# Patient Record
Sex: FEMALE | Employment: FULL TIME | URBAN - METROPOLITAN AREA
[De-identification: names, ages, dates, MRNs, and addresses within clinical notes are randomized per-mention and may not be internally consistent; named-entity substitution may affect disease eponyms.]

---

## 2023-09-06 LAB
C. TRACHOMATIS, EXTERNAL RESULT: NEGATIVE
N. GONORRHOEAE, EXTERNAL RESULT: NEGATIVE

## 2023-10-04 LAB
ABO, EXTERNAL RESULT: NORMAL
HEP B, EXTERNAL RESULT: NEGATIVE
HEPATITIS C ANTIBODY, EXTERNAL RESULT: NEGATIVE
HIV, EXTERNAL RESULT: NEGATIVE
RH FACTOR, EXTERNAL RESULT: POSITIVE
RPR, EXTERNAL RESULT: NEGATIVE
RUBELLA TITER, EXTERNAL RESULT: NORMAL

## 2023-12-29 ENCOUNTER — ROUTINE PRENATAL (OUTPATIENT)
Dept: OBGYN CLINIC | Age: 28
End: 2023-12-29
Payer: MEDICAID

## 2023-12-29 VITALS
RESPIRATION RATE: 18 BRPM | HEIGHT: 67 IN | WEIGHT: 238 LBS | SYSTOLIC BLOOD PRESSURE: 124 MMHG | HEART RATE: 88 BPM | OXYGEN SATURATION: 99 % | BODY MASS INDEX: 37.35 KG/M2 | DIASTOLIC BLOOD PRESSURE: 70 MMHG | TEMPERATURE: 97 F

## 2023-12-29 DIAGNOSIS — O40.9XX0 POLYHYDRAMNIOS AFFECTING PREGNANCY: ICD-10-CM

## 2023-12-29 DIAGNOSIS — Z34.82 PRENATAL CARE, SUBSEQUENT PREGNANCY IN SECOND TRIMESTER: Primary | ICD-10-CM

## 2023-12-29 DIAGNOSIS — A60.00 HERPES SIMPLEX INFECTION OF GENITOURINARY SYSTEM: ICD-10-CM

## 2023-12-29 PROCEDURE — 99213 OFFICE O/P EST LOW 20 MIN: CPT | Performed by: OBSTETRICS & GYNECOLOGY

## 2023-12-30 NOTE — ASSESSMENT & PLAN NOTE
New diagnosis today, MVP 8.0cm however YVROSE not calculated  - will reassess in 2 weeks, plan for GTT at that time as well given macrosomia as well

## 2023-12-30 NOTE — ASSESSMENT & PLAN NOTE
- FWB: reassuring by US today  - Genetic screening: [id number] neg, it's a boy  - Anatomy scan: 12/1/23 -- 534g (93%ile), nl anatomy (limited profile, orbits, AA, DA), CL 5.40cm, nl fluid, vertex    - 12/28/23 -- 1011g (91%ile), nl completion anatomy, borderline poly (MVP 8.0cm)  - Flu shot: discuss next visit  - Tdap: 28 weeks  - PNL: A pos/ab neg, R immune, Varicella non-immune, HepB non-reactive, HepC negative, HIV non-reactive, Syphilis non-reactive, Hgb 12.7, Plt 293, UDS collected today, UCx mixed ronny, GCCT neg/neg, Pap NILM 2/17/2022     - GTT/CBC next visit

## 2024-01-16 RX ORDER — PROMETHAZINE HYDROCHLORIDE 12.5 MG/1
TABLET ORAL
Qty: 20 TABLET | Refills: 0 | Status: SHIPPED | OUTPATIENT
Start: 2024-01-16

## 2024-01-19 ENCOUNTER — ROUTINE PRENATAL (OUTPATIENT)
Dept: OBGYN CLINIC | Age: 29
End: 2024-01-19

## 2024-01-19 VITALS
WEIGHT: 243.2 LBS | HEART RATE: 100 BPM | DIASTOLIC BLOOD PRESSURE: 84 MMHG | TEMPERATURE: 98.4 F | BODY MASS INDEX: 38.17 KG/M2 | HEIGHT: 67 IN | OXYGEN SATURATION: 97 % | SYSTOLIC BLOOD PRESSURE: 132 MMHG

## 2024-01-19 DIAGNOSIS — O40.9XX0 POLYHYDRAMNIOS AFFECTING PREGNANCY: ICD-10-CM

## 2024-01-19 DIAGNOSIS — Z34.83 PRENATAL CARE, SUBSEQUENT PREGNANCY IN THIRD TRIMESTER: Primary | ICD-10-CM

## 2024-01-19 DIAGNOSIS — A60.00 HERPES SIMPLEX INFECTION OF GENITOURINARY SYSTEM: ICD-10-CM

## 2024-01-19 LAB
BASOPHILS # BLD: 0 K/UL (ref 0–0.2)
BASOPHILS NFR BLD: 0.6 %
DEPRECATED RDW RBC AUTO: 12.8 % (ref 12.4–15.4)
EOSINOPHIL # BLD: 0.3 K/UL (ref 0–0.6)
EOSINOPHIL NFR BLD: 5.3 %
HCT VFR BLD AUTO: 32.9 % (ref 36–48)
HGB BLD-MCNC: 11.4 G/DL (ref 12–16)
LYMPHOCYTES # BLD: 1.4 K/UL (ref 1–5.1)
LYMPHOCYTES NFR BLD: 22.6 %
MCH RBC QN AUTO: 30.9 PG (ref 26–34)
MCHC RBC AUTO-ENTMCNC: 34.6 G/DL (ref 31–36)
MCV RBC AUTO: 89.3 FL (ref 80–100)
MONOCYTES # BLD: 0.4 K/UL (ref 0–1.3)
MONOCYTES NFR BLD: 6.9 %
NEUTROPHILS # BLD: 4.1 K/UL (ref 1.7–7.7)
NEUTROPHILS NFR BLD: 64.6 %
PLATELET # BLD AUTO: 318 K/UL (ref 135–450)
PMV BLD AUTO: 8.8 FL (ref 5–10.5)
RBC # BLD AUTO: 3.68 M/UL (ref 4–5.2)
WBC # BLD AUTO: 6.4 K/UL (ref 4–11)

## 2024-01-19 NOTE — ASSESSMENT & PLAN NOTE
- FWB: reassuring by US today  - Genetic screening: [id number] neg, it's a boy  - Anatomy scan: 12/1/23 -- 534g (93%ile), nl anatomy (limited profile, orbits, AA, DA), CL 5.40cm, nl fluid, vertex    - 12/28/23 -- 1011g (91%ile), nl completion anatomy, borderline poly (MVP 8.0cm)  - Flu shot: 1/19/24  - Tdap: 1/19/24  - PNL: A pos/ab neg, R immune, Varicella non-immune, HepB non-reactive, HepC negative, HIV non-reactive, Syphilis non-reactive, Hgb 12.7, Plt 293, UDS collected today, UCx mixed ronny, GCCT neg/neg, Pap NILM 2/17/2022     - GTT/CBC sent today

## 2024-01-19 NOTE — PROGRESS NOTES
Return OB Office Visit    CC:   Chief Complaint   Patient presents with    Routine Prenatal Visit    Ultrasound       HPI:  Pt seen and examined. No concerns/complaints. Denies VB, LOF, ctx. +FM. Just tired but otherwise feeling well.     Maternal wellness questionnaire reviewed - no concerns today.     Objective:  /84 (Site: Right Upper Arm, Position: Sitting, Cuff Size: Medium Adult)   Pulse 100   Temp 98.4 °F (36.9 °C) (Temporal)   Ht 1.702 m (5' 7\")   Wt 110.3 kg (243 lb 3.2 oz)   SpO2 97%   BMI 38.09 kg/m²   Gen: AO, NAD  Abd: Soft, NT  FHT: 150    Assessment/Plan:  28 y.o.  at 28w4d (Estimated Date of Delivery: 24) presents for GERTRUDE appointment:     Problem List Items Addressed This Visit          Genitourinary    Genital herpes simplex     - will plan prophylaxis at 36 weeks             Other    Prenatal care, subsequent pregnancy in third trimester - Primary     - FWB: reassuring by US today  - Genetic screening: [id number] neg, it's a boy  - Anatomy scan: 23 -- 534g (93%ile), nl anatomy (limited profile, orbits, AA, DA), CL 5.40cm, nl fluid, vertex    - 23 -- 1011g (91%ile), nl completion anatomy, borderline poly (MVP 8.0cm)  - Flu shot: 24  - Tdap: 24  - PNL: A pos/ab neg, R immune, Varicella non-immune, HepB non-reactive, HepC negative, HIV non-reactive, Syphilis non-reactive, Hgb 12.7, Plt 293, UDS collected today, UCx mixed ronny, GCCT neg/neg, Pap NILM 2022     - GTT/CBC sent today          Relevant Orders    CBC with Auto Differential    Glucose Challenge Gestational    Polyhydramnios affecting pregnancy (RESOLVED)     New diagnosis at 25 weeks, MVP 8.0cm however YVROSE not calculated  -  YVROSE 24: 20cm             Dispo: RTC in 2 weeks  Mariela Quiroz MD

## 2024-01-20 LAB — GLUCOSE 1H P 50 G GLC PO SERPL-MCNC: 110 MG/DL

## 2024-01-30 ENCOUNTER — ROUTINE PRENATAL (OUTPATIENT)
Dept: OBGYN CLINIC | Age: 29
End: 2024-01-30
Payer: MEDICAID

## 2024-01-30 VITALS
HEART RATE: 88 BPM | SYSTOLIC BLOOD PRESSURE: 128 MMHG | BODY MASS INDEX: 38.74 KG/M2 | TEMPERATURE: 98.1 F | HEIGHT: 67 IN | WEIGHT: 246.8 LBS | OXYGEN SATURATION: 98 % | DIASTOLIC BLOOD PRESSURE: 77 MMHG

## 2024-01-30 DIAGNOSIS — Z34.83 PRENATAL CARE, SUBSEQUENT PREGNANCY IN THIRD TRIMESTER: Primary | ICD-10-CM

## 2024-01-30 DIAGNOSIS — A60.00 HERPES SIMPLEX INFECTION OF GENITOURINARY SYSTEM: ICD-10-CM

## 2024-01-30 DIAGNOSIS — O40.9XX0 POLYHYDRAMNIOS AFFECTING PREGNANCY: ICD-10-CM

## 2024-01-30 PROCEDURE — 99213 OFFICE O/P EST LOW 20 MIN: CPT | Performed by: OBSTETRICS & GYNECOLOGY

## 2024-01-30 NOTE — ASSESSMENT & PLAN NOTE
- FWB: reassuring by US today  - Genetic screening: [id number] neg, it's a boy  - Anatomy scan: 12/1/23 -- 534g (93%ile), nl anatomy (limited profile, orbits, AA, DA), CL 5.40cm, nl fluid, vertex    - 12/28/23 -- 1011g (91%ile), nl completion anatomy, borderline poly (MVP 8.0cm)  - Flu shot: 1/19/24  - Tdap: 1/19/24  - PNL: A pos/ab neg, R immune, Varicella non-immune, HepB non-reactive, HepC negative, HIV non-reactive, Syphilis non-reactive, Hgb 12.7, Plt 293, UDS collected today, UCx mixed ronny, GCCT neg/neg, Pap NILM 2/17/2022     - , Hgb 11.4

## 2024-01-30 NOTE — PROGRESS NOTES
Return OB Office Visit    CC:   Chief Complaint   Patient presents with    Routine Prenatal Visit       HPI:  Pt seen and examined. No concerns/complaints. Denies VB, LOF, ctx. +FM. Having some increased fatigue and swelling, normal pregnancy symptoms per her report.    Objective:  /77 (Site: Right Upper Arm, Position: Sitting, Cuff Size: Medium Adult)   Pulse 88   Temp 98.1 °F (36.7 °C) (Temporal)   Ht 1.702 m (5' 7\")   Wt 111.9 kg (246 lb 12.8 oz)   SpO2 98%   BMI 38.65 kg/m²   Gen: AO, NAD  Abd: Soft, NT  FHT: 147  FH: 30cm, unk by Leopolds    Assessment/Plan:  28 y.o.  at 30w1d (Estimated Date of Delivery: 24) presents for GERTRUDE appointment:     Problem List Items Addressed This Visit          Genitourinary    Genital herpes simplex     - will plan prophylaxis at 36 weeks             Other    Prenatal care, subsequent pregnancy in third trimester - Primary     - FWB: reassuring by US today  - Genetic screening: [id number] neg, it's a boy  - Anatomy scan: 23 -- 534g (93%ile), nl anatomy (limited profile, orbits, AA, DA), CL 5.40cm, nl fluid, vertex    - 23 -- 1011g (91%ile), nl completion anatomy, borderline poly (MVP 8.0cm)  - Flu shot: 24  - Tdap: 24  - PNL: A pos/ab neg, R immune, Varicella non-immune, HepB non-reactive, HepC negative, HIV non-reactive, Syphilis non-reactive, Hgb 12.7, Plt 293, UDS collected today, UCx mixed ronny, GCCT neg/neg, Pap NILM 2022     - , Hgb 11.4          Polyhydramnios affecting pregnancy (RESOLVED)     - Desires spontaneous labor this time, no IOL unless medically indicated  - Plan growth US at 34-36 weeks (91%ile on , FH appropriate today)    Dispo: RTC in 2 weeks  Mariela Quiroz MD

## 2024-02-13 ENCOUNTER — ROUTINE PRENATAL (OUTPATIENT)
Dept: OBGYN CLINIC | Age: 29
End: 2024-02-13
Payer: MEDICAID

## 2024-02-13 VITALS
DIASTOLIC BLOOD PRESSURE: 84 MMHG | WEIGHT: 248.4 LBS | SYSTOLIC BLOOD PRESSURE: 126 MMHG | HEIGHT: 67 IN | HEART RATE: 76 BPM | BODY MASS INDEX: 38.99 KG/M2 | TEMPERATURE: 97.5 F | OXYGEN SATURATION: 98 %

## 2024-02-13 DIAGNOSIS — F32.A DEPRESSION AFFECTING PREGNANCY: ICD-10-CM

## 2024-02-13 DIAGNOSIS — O21.9 NAUSEA AND VOMITING IN PREGNANCY: ICD-10-CM

## 2024-02-13 DIAGNOSIS — O40.9XX0 POLYHYDRAMNIOS AFFECTING PREGNANCY: ICD-10-CM

## 2024-02-13 DIAGNOSIS — A60.00 HERPES SIMPLEX INFECTION OF GENITOURINARY SYSTEM: ICD-10-CM

## 2024-02-13 DIAGNOSIS — Z34.83 PRENATAL CARE, SUBSEQUENT PREGNANCY IN THIRD TRIMESTER: Primary | ICD-10-CM

## 2024-02-13 DIAGNOSIS — O99.340 DEPRESSION AFFECTING PREGNANCY: ICD-10-CM

## 2024-02-13 PROCEDURE — 99213 OFFICE O/P EST LOW 20 MIN: CPT | Performed by: OBSTETRICS & GYNECOLOGY

## 2024-02-13 RX ORDER — PROMETHAZINE HYDROCHLORIDE 12.5 MG/1
TABLET ORAL
Qty: 20 TABLET | Refills: 0 | Status: SHIPPED | OUTPATIENT
Start: 2024-02-13

## 2024-02-13 NOTE — PROGRESS NOTES
Return OB Office Visit    CC:   Chief Complaint   Patient presents with    Routine Prenatal Visit       HPI:  Pt seen and examined. No concerns/complaints. Denies VB, LOF, ctx. +FM. Really tired.     Maternal wellness questionnaire reviewed - no concerns today.     Objective:  /84 (Site: Right Upper Arm, Position: Sitting, Cuff Size: Medium Adult)   Pulse 76   Temp 97.5 °F (36.4 °C) (Temporal)   Ht 1.702 m (5' 7\")   Wt 112.7 kg (248 lb 6.4 oz)   SpO2 98%   BMI 38.90 kg/m²   Gen: AO, NAD  Abd: Soft, NT  FHT: 155  FH: 33cm, unk by Leopolds    Assessment/Plan:  28 y.o.  at 32w1d (Estimated Date of Delivery: 24) presents for GERTRUDE appointment:     Problem List Items Addressed This Visit          Digestive    Nausea and vomiting in pregnancy       - Doing well with Phenergan     - Refill provided               Genitourinary    Genital herpes simplex     - will plan prophylaxis at 36 weeks             Other    Depression affecting pregnancy     Moods stable, will continue to monitor  - on effexor          Prenatal care, subsequent pregnancy in third trimester - Primary     - FWB: reassuring by US today  - Genetic screening: [id number] neg, it's a boy  - Anatomy scan: 23 -- 534g (93%ile), nl anatomy (limited profile, orbits, AA, DA), CL 5.40cm, nl fluid, vertex    - 23 -- 1011g (91%ile), nl completion anatomy, borderline poly (MVP 8.0cm)  - Flu shot: 24  - Tdap: 24  - PNL: A pos/ab neg, R immune, Varicella non-immune, HepB non-reactive, HepC negative, HIV non-reactive, Syphilis non-reactive, Hgb 12.7, Plt 293, UDS collected today, UCx mixed ronny, GCCT neg/neg, Pap NILM 2022     - , Hgb 11.4          Polyhydramnios affecting pregnancy (RESOLVED)     New diagnosis at 25 weeks, MVP 8.0cm however YVROSE not calculated  -  YVROSE 24: 20cm           - Desires spontaneous labor, no IOL unless medically indicatd    Dispo: RTC in 2 weeks with US for growth/presentation  Mariela

## 2024-02-13 NOTE — ASSESSMENT & PLAN NOTE
- FWB: reassuring by US today  - Genetic screening: [id number] neg, it's a boy  - Anatomy scan: 12/1/23 -- 534g (93%ile), nl anatomy (limited profile, orbits, AA, DA), CL 5.40cm, nl fluid, vertex    - 12/28/23 -- 1011g (91%ile), nl completion anatomy, borderline poly (MVP 8.0cm)  - Flu shot: 1/19/24  - Tdap: 1/19/24  - PNL: A pos/ab neg, R immune, Varicella non-immune, HepB non-reactive, HepC negative, HIV non-reactive, Syphilis non-reactive, Hgb 12.7, Plt 293, UDS collected today, UCx mixed ronny, GCCT neg/neg, Pap NILM 2/17/2022     - , Hgb 11.4

## 2024-02-29 ENCOUNTER — ROUTINE PRENATAL (OUTPATIENT)
Dept: OBGYN CLINIC | Age: 29
End: 2024-02-29
Payer: MEDICAID

## 2024-02-29 VITALS
HEART RATE: 78 BPM | DIASTOLIC BLOOD PRESSURE: 86 MMHG | SYSTOLIC BLOOD PRESSURE: 130 MMHG | WEIGHT: 256.2 LBS | BODY MASS INDEX: 40.13 KG/M2

## 2024-02-29 DIAGNOSIS — Z34.83 PRENATAL CARE, SUBSEQUENT PREGNANCY IN THIRD TRIMESTER: Primary | ICD-10-CM

## 2024-02-29 DIAGNOSIS — F32.A DEPRESSION AFFECTING PREGNANCY: ICD-10-CM

## 2024-02-29 DIAGNOSIS — O99.340 DEPRESSION AFFECTING PREGNANCY: ICD-10-CM

## 2024-02-29 DIAGNOSIS — A60.00 HERPES SIMPLEX INFECTION OF GENITOURINARY SYSTEM: ICD-10-CM

## 2024-02-29 DIAGNOSIS — O40.9XX0 POLYHYDRAMNIOS AFFECTING PREGNANCY: ICD-10-CM

## 2024-02-29 DIAGNOSIS — O21.9 NAUSEA AND VOMITING IN PREGNANCY: ICD-10-CM

## 2024-02-29 PROCEDURE — 99213 OFFICE O/P EST LOW 20 MIN: CPT | Performed by: OBSTETRICS & GYNECOLOGY

## 2024-02-29 NOTE — PROGRESS NOTES
Return OB Office Visit    CC:   Chief Complaint   Patient presents with    Routine Prenatal Visit    Ultrasound       HPI:  Pt seen and examined. No concerns/complaints. Denies VB, LOF, ctx. +FM.    Maternal wellness questionnaire reviewed - no concerns today. Score 6.     Objective:  /86   Pulse 78   Wt 116.2 kg (256 lb 3.2 oz)   BMI 40.13 kg/m²   Gen: AO, NAD  Abd: Soft, NT  FHT: 155    Assessment/Plan:  28 y.o.  at 34w3d (Estimated Date of Delivery: 24) presents for GERTRUDE appointment:     Problem List Items Addressed This Visit          Digestive    Nausea and vomiting in pregnancy       - Doing well with Phenergan             Genitourinary    Genital herpes simplex     - will plan prophylaxis at 36 weeks             Other    Depression affecting pregnancy     Moods stable, will continue to monitor  - on effexor          Prenatal care, subsequent pregnancy in third trimester - Primary     - FWB: reassuring by US today  - Genetic screening: [id number] neg, it's a boy  - Anatomy scan: 23 -- 534g (93%ile), nl anatomy (limited profile, orbits, AA, DA), CL 5.40cm, nl fluid, vertex    - 23 -- 1011g (91%ile), nl completion anatomy, borderline poly (MVP 8.0cm)    - 24: 2879g (89%ile), nl fluid, vertex  - Flu shot: 24  - Tdap: 24  - PNL: A pos/ab neg, R immune, Varicella non-immune, HepB non-reactive, HepC negative, HIV non-reactive, Syphilis non-reactive, Hgb 12.7, Plt 293, UDS collected today, UCx mixed ronny, GCCT neg/neg, Pap NILM 2022     - , Hgb 11.4    - GBS next visit         Polyhydramnios affecting pregnancy (RESOLVED)     New diagnosis at 25 weeks, MVP 8.0cm however YVROSE not calculated  -  YVROSE 24: 20cm  - YVROSE 24: 19.27cm             Dispo: RTC in 2 weeks, GBS/SVE, start acyclovir  Mariela Quiroz MD

## 2024-02-29 NOTE — ASSESSMENT & PLAN NOTE
- FWB: reassuring by US today  - Genetic screening: [id number] neg, it's a boy  - Anatomy scan: 12/1/23 -- 534g (93%ile), nl anatomy (limited profile, orbits, AA, DA), CL 5.40cm, nl fluid, vertex    - 12/28/23 -- 1011g (91%ile), nl completion anatomy, borderline poly (MVP 8.0cm)    - 2/29/24: 2879g (89%ile), nl fluid, vertex  - Flu shot: 1/19/24  - Tdap: 1/19/24  - PNL: A pos/ab neg, R immune, Varicella non-immune, HepB non-reactive, HepC negative, HIV non-reactive, Syphilis non-reactive, Hgb 12.7, Plt 293, UDS collected today, UCx mixed ronny, GCCT neg/neg, Pap NILM 2/17/2022     - , Hgb 11.4    - GBS next visit

## 2024-02-29 NOTE — ASSESSMENT & PLAN NOTE
New diagnosis at 25 weeks, MVP 8.0cm however YVROSE not calculated  -  YVROSE 1/19/24: 20cm  - YVROSE 2/29/24: 19.27cm

## 2024-03-14 RX ORDER — PROMETHAZINE HYDROCHLORIDE 12.5 MG/1
TABLET ORAL
Qty: 20 TABLET | Refills: 0 | Status: SHIPPED | OUTPATIENT
Start: 2024-03-14

## 2024-03-15 ENCOUNTER — ROUTINE PRENATAL (OUTPATIENT)
Dept: OBGYN CLINIC | Age: 29
End: 2024-03-15

## 2024-03-15 VITALS
BODY MASS INDEX: 40.97 KG/M2 | WEIGHT: 261 LBS | OXYGEN SATURATION: 98 % | SYSTOLIC BLOOD PRESSURE: 126 MMHG | DIASTOLIC BLOOD PRESSURE: 82 MMHG | TEMPERATURE: 98.4 F | HEART RATE: 85 BPM | HEIGHT: 67 IN

## 2024-03-15 DIAGNOSIS — O99.340 DEPRESSION AFFECTING PREGNANCY: ICD-10-CM

## 2024-03-15 DIAGNOSIS — O40.9XX0 POLYHYDRAMNIOS AFFECTING PREGNANCY: ICD-10-CM

## 2024-03-15 DIAGNOSIS — Z34.83 PRENATAL CARE, SUBSEQUENT PREGNANCY IN THIRD TRIMESTER: Primary | ICD-10-CM

## 2024-03-15 DIAGNOSIS — A60.00 HERPES SIMPLEX INFECTION OF GENITOURINARY SYSTEM: ICD-10-CM

## 2024-03-15 DIAGNOSIS — F32.A DEPRESSION AFFECTING PREGNANCY: ICD-10-CM

## 2024-03-15 LAB — GBS, EXTERNAL RESULT: NEGATIVE

## 2024-03-15 RX ORDER — ACYCLOVIR 400 MG/1
400 TABLET ORAL 3 TIMES DAILY
Qty: 90 TABLET | Refills: 0 | Status: SHIPPED | OUTPATIENT
Start: 2024-03-15 | End: 2024-04-14

## 2024-03-15 NOTE — ASSESSMENT & PLAN NOTE
- FWB: reassuring by US today  - Genetic screening: [id number] neg, it's a boy  - Anatomy scan: 12/1/23 -- 534g (93%ile), nl anatomy (limited profile, orbits, AA, DA), CL 5.40cm, nl fluid, vertex    - 12/28/23 -- 1011g (91%ile), nl completion anatomy, borderline poly (MVP 8.0cm)    - 2/29/24: 2879g (89%ile), nl fluid, vertex  - Flu shot: 1/19/24  - Tdap: 1/19/24  - PNL: A pos/ab neg, R immune, Varicella non-immune, HepB non-reactive, HepC negative, HIV non-reactive, Syphilis non-reactive, Hgb 12.7, Plt 293, UDS collected today, UCx mixed ronny, GCCT neg/neg, Pap NILM 2/17/2022     - , Hgb 11.4    - GBS sent today

## 2024-03-17 LAB — GP B STREP SPEC QL CULT: NORMAL

## 2024-03-18 LAB — GP B STREP SPEC QL CULT: NORMAL

## 2024-03-22 ENCOUNTER — ROUTINE PRENATAL (OUTPATIENT)
Dept: OBGYN CLINIC | Age: 29
End: 2024-03-22

## 2024-03-22 ENCOUNTER — HOSPITAL ENCOUNTER (OUTPATIENT)
Age: 29
Discharge: LEFT BEFORE DISCHARGE | DRG: 560 | End: 2024-03-26
Attending: OBSTETRICS & GYNECOLOGY | Admitting: OBSTETRICS & GYNECOLOGY
Payer: MEDICAID

## 2024-03-22 VITALS
HEIGHT: 67 IN | DIASTOLIC BLOOD PRESSURE: 94 MMHG | TEMPERATURE: 99.1 F | OXYGEN SATURATION: 98 % | HEART RATE: 85 BPM | WEIGHT: 264.6 LBS | SYSTOLIC BLOOD PRESSURE: 152 MMHG | BODY MASS INDEX: 41.53 KG/M2

## 2024-03-22 DIAGNOSIS — O16.9 ELEVATED BLOOD PRESSURE AFFECTING PREGNANCY, ANTEPARTUM: ICD-10-CM

## 2024-03-22 DIAGNOSIS — Z34.83 PRENATAL CARE, SUBSEQUENT PREGNANCY IN THIRD TRIMESTER: Primary | ICD-10-CM

## 2024-03-22 DIAGNOSIS — O40.9XX0 POLYHYDRAMNIOS AFFECTING PREGNANCY: ICD-10-CM

## 2024-03-22 DIAGNOSIS — A60.00 HERPES SIMPLEX INFECTION OF GENITOURINARY SYSTEM: ICD-10-CM

## 2024-03-22 DIAGNOSIS — O99.340 DEPRESSION AFFECTING PREGNANCY: ICD-10-CM

## 2024-03-22 DIAGNOSIS — F32.A DEPRESSION AFFECTING PREGNANCY: ICD-10-CM

## 2024-03-22 PROBLEM — O14.10 SEVERE PRE-ECLAMPSIA, ANTEPARTUM: Status: ACTIVE | Noted: 2024-03-22

## 2024-03-22 LAB
ABO + RH BLD: NORMAL
ALBUMIN SERPL-MCNC: 3.3 G/DL (ref 3.4–5)
ALBUMIN/GLOB SERPL: 1.1 {RATIO} (ref 1.1–2.2)
ALP SERPL-CCNC: 171 U/L (ref 40–129)
ALT SERPL-CCNC: 13 U/L (ref 10–40)
AMPHETAMINES UR QL SCN>1000 NG/ML: NORMAL
ANION GAP SERPL CALCULATED.3IONS-SCNC: 12 MMOL/L (ref 3–16)
AST SERPL-CCNC: 17 U/L (ref 15–37)
BARBITURATES UR QL SCN>200 NG/ML: NORMAL
BENZODIAZ UR QL SCN>200 NG/ML: NORMAL
BILIRUB SERPL-MCNC: <0.2 MG/DL (ref 0–1)
BLD GP AB SCN SERPL QL: NORMAL
BUN SERPL-MCNC: 8 MG/DL (ref 7–20)
BUPRENORPHINE+NOR UR QL SCN: NORMAL
CALCIUM SERPL-MCNC: 8.5 MG/DL (ref 8.3–10.6)
CANNABINOIDS UR QL SCN>50 NG/ML: NORMAL
CHLORIDE SERPL-SCNC: 102 MMOL/L (ref 99–110)
CO2 SERPL-SCNC: 19 MMOL/L (ref 21–32)
COCAINE UR QL SCN: NORMAL
CREAT SERPL-MCNC: <0.5 MG/DL (ref 0.6–1.1)
CREAT UR-MCNC: 215.5 MG/DL (ref 28–259)
DEPRECATED RDW RBC AUTO: 13.5 % (ref 12.4–15.4)
DRUG SCREEN COMMENT UR-IMP: NORMAL
FENTANYL SCREEN, URINE: NORMAL
GFR SERPLBLD CREATININE-BSD FMLA CKD-EPI: >60 ML/MIN/{1.73_M2}
GLUCOSE SERPL-MCNC: 143 MG/DL (ref 70–99)
HCT VFR BLD AUTO: 32.4 % (ref 36–48)
HGB BLD-MCNC: 10.8 G/DL (ref 12–16)
LDH SERPL L TO P-CCNC: 231 U/L (ref 100–190)
MCH RBC QN AUTO: 29.3 PG (ref 26–34)
MCHC RBC AUTO-ENTMCNC: 33.2 G/DL (ref 31–36)
MCV RBC AUTO: 88.1 FL (ref 80–100)
METHADONE UR QL SCN>300 NG/ML: NORMAL
OPIATES UR QL SCN>300 NG/ML: NORMAL
OXYCODONE UR QL SCN: NORMAL
PCP UR QL SCN>25 NG/ML: NORMAL
PH UR STRIP: 6 [PH]
PLATELET # BLD AUTO: 268 K/UL (ref 135–450)
PMV BLD AUTO: 8.8 FL (ref 5–10.5)
POTASSIUM SERPL-SCNC: 4.1 MMOL/L (ref 3.5–5.1)
PROT SERPL-MCNC: 6.2 G/DL (ref 6.4–8.2)
PROT UR-MCNC: 120 MG/DL
PROT/CREAT UR-RTO: 0.6 MG/DL
RBC # BLD AUTO: 3.68 M/UL (ref 4–5.2)
SODIUM SERPL-SCNC: 133 MMOL/L (ref 136–145)
URATE SERPL-MCNC: 4.8 MG/DL (ref 2.6–6)
WBC # BLD AUTO: 6.4 K/UL (ref 4–11)

## 2024-03-22 PROCEDURE — 86850 RBC ANTIBODY SCREEN: CPT

## 2024-03-22 PROCEDURE — 6360000002 HC RX W HCPCS

## 2024-03-22 PROCEDURE — 80053 COMPREHEN METABOLIC PANEL: CPT

## 2024-03-22 PROCEDURE — 6360000002 HC RX W HCPCS: Performed by: OBSTETRICS & GYNECOLOGY

## 2024-03-22 PROCEDURE — 85027 COMPLETE CBC AUTOMATED: CPT

## 2024-03-22 PROCEDURE — 84156 ASSAY OF PROTEIN URINE: CPT

## 2024-03-22 PROCEDURE — 86900 BLOOD TYPING SEROLOGIC ABO: CPT

## 2024-03-22 PROCEDURE — 83615 LACTATE (LD) (LDH) ENZYME: CPT

## 2024-03-22 PROCEDURE — 82570 ASSAY OF URINE CREATININE: CPT

## 2024-03-22 PROCEDURE — 3E033VJ INTRODUCTION OF OTHER HORMONE INTO PERIPHERAL VEIN, PERCUTANEOUS APPROACH: ICD-10-PCS | Performed by: OBSTETRICS & GYNECOLOGY

## 2024-03-22 PROCEDURE — 2580000003 HC RX 258: Performed by: OBSTETRICS & GYNECOLOGY

## 2024-03-22 PROCEDURE — 84550 ASSAY OF BLOOD/URIC ACID: CPT

## 2024-03-22 PROCEDURE — 86780 TREPONEMA PALLIDUM: CPT

## 2024-03-22 PROCEDURE — 36415 COLL VENOUS BLD VENIPUNCTURE: CPT

## 2024-03-22 PROCEDURE — 86901 BLOOD TYPING SEROLOGIC RH(D): CPT

## 2024-03-22 PROCEDURE — 1220000000 HC SEMI PRIVATE OB R&B

## 2024-03-22 PROCEDURE — 59200 INSERT CERVICAL DILATOR: CPT | Performed by: OBSTETRICS & GYNECOLOGY

## 2024-03-22 PROCEDURE — 80307 DRUG TEST PRSMV CHEM ANLYZR: CPT

## 2024-03-22 RX ORDER — CALCIUM GLUCONATE 94 MG/ML
1000 INJECTION, SOLUTION INTRAVENOUS PRN
Status: DISCONTINUED | OUTPATIENT
Start: 2024-03-22 | End: 2024-03-23

## 2024-03-22 RX ORDER — LABETALOL HYDROCHLORIDE 5 MG/ML
40 INJECTION, SOLUTION INTRAVENOUS
Status: DISCONTINUED | OUTPATIENT
Start: 2024-03-22 | End: 2024-03-23

## 2024-03-22 RX ORDER — MAGNESIUM SULFATE HEPTAHYDRATE 40 MG/ML
4000 INJECTION, SOLUTION INTRAVENOUS ONCE
Status: COMPLETED | OUTPATIENT
Start: 2024-03-22 | End: 2024-03-22

## 2024-03-22 RX ORDER — HYDRALAZINE HYDROCHLORIDE 20 MG/ML
10 INJECTION INTRAMUSCULAR; INTRAVENOUS
Status: DISCONTINUED | OUTPATIENT
Start: 2024-03-22 | End: 2024-03-23

## 2024-03-22 RX ORDER — LABETALOL HYDROCHLORIDE 5 MG/ML
80 INJECTION, SOLUTION INTRAVENOUS ONCE
Status: DISCONTINUED | OUTPATIENT
Start: 2024-03-22 | End: 2024-03-23

## 2024-03-22 RX ORDER — SODIUM CHLORIDE 9 MG/ML
INJECTION, SOLUTION INTRAVENOUS PRN
Status: DISCONTINUED | OUTPATIENT
Start: 2024-03-22 | End: 2024-03-23

## 2024-03-22 RX ORDER — LABETALOL HYDROCHLORIDE 5 MG/ML
INJECTION, SOLUTION INTRAVENOUS
Status: COMPLETED
Start: 2024-03-22 | End: 2024-03-22

## 2024-03-22 RX ORDER — ACETAMINOPHEN 325 MG/1
650 TABLET ORAL EVERY 4 HOURS PRN
Status: DISCONTINUED | OUTPATIENT
Start: 2024-03-22 | End: 2024-03-23

## 2024-03-22 RX ORDER — LABETALOL HYDROCHLORIDE 5 MG/ML
40 INJECTION, SOLUTION INTRAVENOUS ONCE
Status: COMPLETED | OUTPATIENT
Start: 2024-03-22 | End: 2024-03-22

## 2024-03-22 RX ORDER — LABETALOL HYDROCHLORIDE 5 MG/ML
20 INJECTION, SOLUTION INTRAVENOUS
Status: DISCONTINUED | OUTPATIENT
Start: 2024-03-22 | End: 2024-03-23

## 2024-03-22 RX ORDER — SODIUM CHLORIDE, SODIUM LACTATE, POTASSIUM CHLORIDE, CALCIUM CHLORIDE 600; 310; 30; 20 MG/100ML; MG/100ML; MG/100ML; MG/100ML
INJECTION, SOLUTION INTRAVENOUS CONTINUOUS
Status: DISCONTINUED | OUTPATIENT
Start: 2024-03-22 | End: 2024-03-23

## 2024-03-22 RX ORDER — SODIUM CHLORIDE 0.9 % (FLUSH) 0.9 %
5-40 SYRINGE (ML) INJECTION PRN
Status: DISCONTINUED | OUTPATIENT
Start: 2024-03-22 | End: 2024-03-23

## 2024-03-22 RX ORDER — LABETALOL HYDROCHLORIDE 5 MG/ML
20 INJECTION, SOLUTION INTRAVENOUS ONCE
Status: COMPLETED | OUTPATIENT
Start: 2024-03-22 | End: 2024-03-22

## 2024-03-22 RX ORDER — NALBUPHINE HYDROCHLORIDE 10 MG/ML
10 INJECTION, SOLUTION INTRAMUSCULAR; INTRAVENOUS; SUBCUTANEOUS
Status: DISCONTINUED | OUTPATIENT
Start: 2024-03-22 | End: 2024-03-23 | Stop reason: SDUPTHER

## 2024-03-22 RX ORDER — LABETALOL HYDROCHLORIDE 5 MG/ML
80 INJECTION, SOLUTION INTRAVENOUS
Status: COMPLETED | OUTPATIENT
Start: 2024-03-22 | End: 2024-03-22

## 2024-03-22 RX ORDER — SODIUM CHLORIDE 0.9 % (FLUSH) 0.9 %
5-40 SYRINGE (ML) INJECTION EVERY 12 HOURS SCHEDULED
Status: DISCONTINUED | OUTPATIENT
Start: 2024-03-22 | End: 2024-03-23

## 2024-03-22 RX ADMIN — MAGNESIUM SULFATE HEPTAHYDRATE 4000 MG: 4 INJECTION, SOLUTION INTRAVENOUS at 19:57

## 2024-03-22 RX ADMIN — LABETALOL HYDROCHLORIDE 20 MG: 5 INJECTION INTRAVENOUS at 15:17

## 2024-03-22 RX ADMIN — NALBUPHINE HYDROCHLORIDE 10 MG: 20 INJECTION, SOLUTION INTRAMUSCULAR; INTRAVENOUS; SUBCUTANEOUS at 23:10

## 2024-03-22 RX ADMIN — SODIUM CHLORIDE, POTASSIUM CHLORIDE, SODIUM LACTATE AND CALCIUM CHLORIDE: 600; 310; 30; 20 INJECTION, SOLUTION INTRAVENOUS at 16:50

## 2024-03-22 RX ADMIN — Medication 1 MILLI-UNITS/MIN: at 23:01

## 2024-03-22 RX ADMIN — LABETALOL HYDROCHLORIDE 80 MG: 5 INJECTION INTRAVENOUS at 19:26

## 2024-03-22 RX ADMIN — LABETALOL HYDROCHLORIDE 40 MG: 5 INJECTION INTRAVENOUS at 16:55

## 2024-03-22 RX ADMIN — LABETALOL HYDROCHLORIDE 80 MG: 5 INJECTION, SOLUTION INTRAVENOUS at 19:26

## 2024-03-22 RX ADMIN — MAGNESIUM SULFATE HEPTAHYDRATE 2000 MG/HR: 40 INJECTION, SOLUTION INTRAVENOUS at 20:13

## 2024-03-22 RX ADMIN — LABETALOL HYDROCHLORIDE 40 MG: 5 INJECTION, SOLUTION INTRAVENOUS at 16:55

## 2024-03-22 ASSESSMENT — PAIN DESCRIPTION - DESCRIPTORS: DESCRIPTORS: CRAMPING

## 2024-03-22 ASSESSMENT — PAIN - FUNCTIONAL ASSESSMENT: PAIN_FUNCTIONAL_ASSESSMENT: ACTIVITIES ARE NOT PREVENTED

## 2024-03-22 ASSESSMENT — PAIN DESCRIPTION - LOCATION: LOCATION: ABDOMEN

## 2024-03-22 ASSESSMENT — PAIN DESCRIPTION - ORIENTATION: ORIENTATION: LOWER

## 2024-03-22 ASSESSMENT — PAIN SCALES - GENERAL: PAINLEVEL_OUTOF10: 6

## 2024-03-22 NOTE — PROGRESS NOTES
Notified Dr. Simmons of recent BP's after labetalol and of PCR 0.6. States to admit patient and she will be over to evaluate POC

## 2024-03-22 NOTE — H&P
Department of Obstetrics and Gynecology  Attending Obstetrics History and Physical        CHIEF COMPLAINT:  elevated blood pressure    HISTORY OF PRESENT ILLNESS:      The patient is a 28 y.o.  2 parity 1001 female at 37 weeks 4 days gestation with EDC 24 presents to triage from office secondary to elevated blood pressures.  Patient presents with a chief complaint as above and is being admitted for persistent severe blood pressures/pre-eclampsia.  Upon arrival to L&D, patient treated with labetalol 20 mg IV.  This was followed by labetalol 40 mg IV.  Urine PCR:  0.6.  Has noted increased swelling in feet, diarrhea and nausea.  Denies headache, vision changes and RUQ pain.    Pregnancy has been complicated by elevated blood pressure, polyhydramnios (resolved), HSV, and depression    DATES:    Last Menstrual Period:  ?  Estimated Due Date:  24    PRENATAL CARE:    Provider:  TOMMY Quiroz MD, [facility]    Blood Type/Rh:  A positive  Antibody Screen:  negative  Rubella:  immune  RPR:  non-reactive  Hepatitis B Surface Antigen: non-reactive  HIV:  non-reactive  Gonorrhea:  negative  Chlamydia:  negative  MSAFP/Multiple Markers:  Date:  ; Results:    U/S Structural Survery:  see report  1 hour Glucose Tolerance Test:  110  Group B Strep:  negative      PAST OB HISTORY        Depression:  Yes      Post-partum depression:  No      Diabetes:  No      Gestational Diabetes:  No      Thyroid Disease:  No      Chronic HTN:  No      Gestation HTN:  Yes       Pre-eclampsia:  Yes       Seizure disorder:  No      Asthma:  No      Clotting disorder:  No      :  No      Tubal ligation:  No      D & C:  No      Cerclage:  No      LEEP:  No      Myomectomy:  No    OB History    Para Term  AB Living   2 1 1 0 0 1   SAB IAB Ectopic Molar Multiple Live Births   0 0 0 0 0 1      # Outcome Date GA Lbr Von/2nd Weight Sex Delivery Anes PTL Lv   2 Current            1 Term 22 40w3d 10:00 /

## 2024-03-22 NOTE — PLAN OF CARE
Problem: Vaginal Birth or  Section  Goal: Fetal and maternal status remain reassuring during the birth process  Description:  Birth OB-Pregnancy care plan goal which identifies if the fetal and maternal status remain reassuring during the birth process  Outcome: Progressing     Problem: Pain  Goal: Verbalizes/displays adequate comfort level or baseline comfort level  Outcome: Progressing     Problem: Safety - Adult  Goal: Free from fall injury  Outcome: Progressing

## 2024-03-22 NOTE — PROGRESS NOTES
Return OB Office Visit    CC:   Chief Complaint   Patient presents with    Routine Prenatal Visit       HPI:  Pt seen and examined. No concerns/complaints. Denies VB, LOF, ctx. +FM. Increased swelling in feet, +diarrhea and nausea. No HA, chest pain, SOB.     Maternal wellness questionnaire reviewed - no concerns today.     Objective:  BP (!) 152/94   Pulse 85   Temp 99.1 °F (37.3 °C) (Temporal)   Ht 1.702 m (5' 7\")   Wt 120 kg (264 lb 9.6 oz)   SpO2 98%   BMI 41.44 kg/m²   Gen: AO, NAD  Abd: Soft, NT  FHT: 160  SVE: fingertip/50/-3    Assessment/Plan:  28 y.o.  at 37w4d (Estimated Date of Delivery: 24) presents for GERTRUDE appointment:      Diagnosis Orders   1. Prenatal care, subsequent pregnancy in third trimester        2. Polyhydramnios affecting pregnancy (RESOLVED)        3. Herpes simplex infection of genitourinary system        4. Depression affecting pregnancy        5. Elevated blood pressure affecting pregnancy, antepartum          Patient with elevated BP in office today, 3+ protein in urine, sent to L&D for serial BP and PIH labs  - asymptomatic at this time  - h/o HSV, on acyclovir  - moods stable on effexor  - GBS negative    Dispo: to L&D triage as above  Mariela Quiroz MD

## 2024-03-23 ENCOUNTER — ANESTHESIA (OUTPATIENT)
Dept: LABOR AND DELIVERY | Age: 29
End: 2024-03-23
Payer: MEDICAID

## 2024-03-23 ENCOUNTER — ANESTHESIA EVENT (OUTPATIENT)
Dept: LABOR AND DELIVERY | Age: 29
End: 2024-03-23
Payer: MEDICAID

## 2024-03-23 LAB
ALBUMIN SERPL-MCNC: 3.2 G/DL (ref 3.4–5)
ALBUMIN SERPL-MCNC: 3.3 G/DL (ref 3.4–5)
ALBUMIN SERPL-MCNC: 3.3 G/DL (ref 3.4–5)
ALBUMIN/GLOB SERPL: 0.9 {RATIO} (ref 1.1–2.2)
ALBUMIN/GLOB SERPL: 1 {RATIO} (ref 1.1–2.2)
ALBUMIN/GLOB SERPL: 1.1 {RATIO} (ref 1.1–2.2)
ALP SERPL-CCNC: 173 U/L (ref 40–129)
ALP SERPL-CCNC: 179 U/L (ref 40–129)
ALP SERPL-CCNC: 187 U/L (ref 40–129)
ALT SERPL-CCNC: 12 U/L (ref 10–40)
ALT SERPL-CCNC: 12 U/L (ref 10–40)
ALT SERPL-CCNC: 13 U/L (ref 10–40)
ANION GAP SERPL CALCULATED.3IONS-SCNC: 11 MMOL/L (ref 3–16)
ANION GAP SERPL CALCULATED.3IONS-SCNC: 12 MMOL/L (ref 3–16)
ANION GAP SERPL CALCULATED.3IONS-SCNC: 12 MMOL/L (ref 3–16)
AST SERPL-CCNC: 20 U/L (ref 15–37)
AST SERPL-CCNC: 21 U/L (ref 15–37)
AST SERPL-CCNC: 22 U/L (ref 15–37)
BILIRUB SERPL-MCNC: 0.3 MG/DL (ref 0–1)
BILIRUB SERPL-MCNC: 0.4 MG/DL (ref 0–1)
BILIRUB SERPL-MCNC: <0.2 MG/DL (ref 0–1)
BUN SERPL-MCNC: 4 MG/DL (ref 7–20)
BUN SERPL-MCNC: 5 MG/DL (ref 7–20)
BUN SERPL-MCNC: 6 MG/DL (ref 7–20)
CALCIUM SERPL-MCNC: 7.3 MG/DL (ref 8.3–10.6)
CALCIUM SERPL-MCNC: 7.6 MG/DL (ref 8.3–10.6)
CALCIUM SERPL-MCNC: 7.9 MG/DL (ref 8.3–10.6)
CHLORIDE SERPL-SCNC: 95 MMOL/L (ref 99–110)
CHLORIDE SERPL-SCNC: 98 MMOL/L (ref 99–110)
CHLORIDE SERPL-SCNC: 99 MMOL/L (ref 99–110)
CO2 SERPL-SCNC: 21 MMOL/L (ref 21–32)
CO2 SERPL-SCNC: 21 MMOL/L (ref 21–32)
CO2 SERPL-SCNC: 23 MMOL/L (ref 21–32)
CREAT SERPL-MCNC: <0.5 MG/DL (ref 0.6–1.1)
DEPRECATED RDW RBC AUTO: 13.7 % (ref 12.4–15.4)
DEPRECATED RDW RBC AUTO: 13.8 % (ref 12.4–15.4)
DEPRECATED RDW RBC AUTO: 13.9 % (ref 12.4–15.4)
GFR SERPLBLD CREATININE-BSD FMLA CKD-EPI: >60 ML/MIN/{1.73_M2}
GLUCOSE SERPL-MCNC: 105 MG/DL (ref 70–99)
GLUCOSE SERPL-MCNC: 106 MG/DL (ref 70–99)
GLUCOSE SERPL-MCNC: 99 MG/DL (ref 70–99)
HCT VFR BLD AUTO: 32.7 % (ref 36–48)
HCT VFR BLD AUTO: 33.4 % (ref 36–48)
HCT VFR BLD AUTO: 34.4 % (ref 36–48)
HGB BLD-MCNC: 10.9 G/DL (ref 12–16)
HGB BLD-MCNC: 10.9 G/DL (ref 12–16)
HGB BLD-MCNC: 11.4 G/DL (ref 12–16)
LDH SERPL L TO P-CCNC: 218 U/L (ref 100–190)
LDH SERPL L TO P-CCNC: 227 U/L (ref 100–190)
LDH SERPL L TO P-CCNC: 288 U/L (ref 100–190)
MAGNESIUM SERPL-MCNC: 4.6 MG/DL (ref 1.8–2.4)
MAGNESIUM SERPL-MCNC: 5 MG/DL (ref 1.8–2.4)
MAGNESIUM SERPL-MCNC: 5.4 MG/DL (ref 1.8–2.4)
MCH RBC QN AUTO: 29 PG (ref 26–34)
MCH RBC QN AUTO: 29.5 PG (ref 26–34)
MCH RBC QN AUTO: 29.7 PG (ref 26–34)
MCHC RBC AUTO-ENTMCNC: 32.7 G/DL (ref 31–36)
MCHC RBC AUTO-ENTMCNC: 33.2 G/DL (ref 31–36)
MCHC RBC AUTO-ENTMCNC: 33.5 G/DL (ref 31–36)
MCV RBC AUTO: 88.6 FL (ref 80–100)
MCV RBC AUTO: 88.7 FL (ref 80–100)
MCV RBC AUTO: 88.8 FL (ref 80–100)
PLATELET # BLD AUTO: 240 K/UL (ref 135–450)
PLATELET # BLD AUTO: 265 K/UL (ref 135–450)
PLATELET # BLD AUTO: 272 K/UL (ref 135–450)
PMV BLD AUTO: 8.7 FL (ref 5–10.5)
PMV BLD AUTO: 9.1 FL (ref 5–10.5)
PMV BLD AUTO: 9.1 FL (ref 5–10.5)
POTASSIUM SERPL-SCNC: 4.1 MMOL/L (ref 3.5–5.1)
POTASSIUM SERPL-SCNC: 4.2 MMOL/L (ref 3.5–5.1)
POTASSIUM SERPL-SCNC: 5.2 MMOL/L (ref 3.5–5.1)
PROT SERPL-MCNC: 6.2 G/DL (ref 6.4–8.2)
PROT SERPL-MCNC: 6.4 G/DL (ref 6.4–8.2)
PROT SERPL-MCNC: 6.9 G/DL (ref 6.4–8.2)
RBC # BLD AUTO: 3.69 M/UL (ref 4–5.2)
RBC # BLD AUTO: 3.76 M/UL (ref 4–5.2)
RBC # BLD AUTO: 3.88 M/UL (ref 4–5.2)
REAGIN+T PALLIDUM IGG+IGM SERPL-IMP: NORMAL
SODIUM SERPL-SCNC: 129 MMOL/L (ref 136–145)
SODIUM SERPL-SCNC: 131 MMOL/L (ref 136–145)
SODIUM SERPL-SCNC: 132 MMOL/L (ref 136–145)
URATE SERPL-MCNC: 5.4 MG/DL (ref 2.6–6)
URATE SERPL-MCNC: 5.5 MG/DL (ref 2.6–6)
URATE SERPL-MCNC: 5.9 MG/DL (ref 2.6–6)
WBC # BLD AUTO: 10.1 K/UL (ref 4–11)
WBC # BLD AUTO: 13.5 K/UL (ref 4–11)
WBC # BLD AUTO: 9.6 K/UL (ref 4–11)

## 2024-03-23 PROCEDURE — 85027 COMPLETE CBC AUTOMATED: CPT

## 2024-03-23 PROCEDURE — 10907ZC DRAINAGE OF AMNIOTIC FLUID, THERAPEUTIC FROM PRODUCTS OF CONCEPTION, VIA NATURAL OR ARTIFICIAL OPENING: ICD-10-PCS | Performed by: OBSTETRICS & GYNECOLOGY

## 2024-03-23 PROCEDURE — 6370000000 HC RX 637 (ALT 250 FOR IP): Performed by: OBSTETRICS & GYNECOLOGY

## 2024-03-23 PROCEDURE — 6360000002 HC RX W HCPCS: Performed by: OBSTETRICS & GYNECOLOGY

## 2024-03-23 PROCEDURE — 88307 TISSUE EXAM BY PATHOLOGIST: CPT

## 2024-03-23 PROCEDURE — 80053 COMPREHEN METABOLIC PANEL: CPT

## 2024-03-23 PROCEDURE — 6360000002 HC RX W HCPCS: Performed by: REGISTERED NURSE

## 2024-03-23 PROCEDURE — 7200000001 HC VAGINAL DELIVERY

## 2024-03-23 PROCEDURE — 83735 ASSAY OF MAGNESIUM: CPT

## 2024-03-23 PROCEDURE — 2500000003 HC RX 250 WO HCPCS: Performed by: REGISTERED NURSE

## 2024-03-23 PROCEDURE — 1220000000 HC SEMI PRIVATE OB R&B

## 2024-03-23 PROCEDURE — 0HQ9XZZ REPAIR PERINEUM SKIN, EXTERNAL APPROACH: ICD-10-PCS | Performed by: OBSTETRICS & GYNECOLOGY

## 2024-03-23 PROCEDURE — 84550 ASSAY OF BLOOD/URIC ACID: CPT

## 2024-03-23 PROCEDURE — 59409 OBSTETRICAL CARE: CPT | Performed by: OBSTETRICS & GYNECOLOGY

## 2024-03-23 PROCEDURE — 2580000003 HC RX 258: Performed by: OBSTETRICS & GYNECOLOGY

## 2024-03-23 PROCEDURE — 3700000025 EPIDURAL BLOCK: Performed by: ANESTHESIOLOGY

## 2024-03-23 PROCEDURE — 36415 COLL VENOUS BLD VENIPUNCTURE: CPT

## 2024-03-23 PROCEDURE — 83615 LACTATE (LD) (LDH) ENZYME: CPT

## 2024-03-23 RX ORDER — BUPIVACAINE HYDROCHLORIDE 5 MG/ML
INJECTION, SOLUTION EPIDURAL; INTRACAUDAL PRN
Status: DISCONTINUED | OUTPATIENT
Start: 2024-03-23 | End: 2024-03-23 | Stop reason: SDUPTHER

## 2024-03-23 RX ORDER — LIDOCAINE HYDROCHLORIDE 10 MG/ML
INJECTION, SOLUTION EPIDURAL; INFILTRATION; INTRACAUDAL; PERINEURAL PRN
Status: DISCONTINUED | OUTPATIENT
Start: 2024-03-23 | End: 2024-03-23 | Stop reason: SDUPTHER

## 2024-03-23 RX ORDER — LANOLIN 100 %
OINTMENT (GRAM) TOPICAL PRN
Status: DISCONTINUED | OUTPATIENT
Start: 2024-03-23 | End: 2024-03-26 | Stop reason: HOSPADM

## 2024-03-23 RX ORDER — DOCUSATE SODIUM 100 MG/1
100 CAPSULE, LIQUID FILLED ORAL 2 TIMES DAILY
Status: DISCONTINUED | OUTPATIENT
Start: 2024-03-23 | End: 2024-03-26 | Stop reason: HOSPADM

## 2024-03-23 RX ORDER — ONDANSETRON 2 MG/ML
4 INJECTION INTRAMUSCULAR; INTRAVENOUS EVERY 6 HOURS PRN
Status: DISCONTINUED | OUTPATIENT
Start: 2024-03-23 | End: 2024-03-23 | Stop reason: SDUPTHER

## 2024-03-23 RX ORDER — SODIUM CHLORIDE 0.9 % (FLUSH) 0.9 %
5-40 SYRINGE (ML) INJECTION PRN
Status: DISCONTINUED | OUTPATIENT
Start: 2024-03-23 | End: 2024-03-26 | Stop reason: HOSPADM

## 2024-03-23 RX ORDER — EPHEDRINE SULFATE 50 MG/ML
INJECTION INTRAVENOUS PRN
Status: DISCONTINUED | OUTPATIENT
Start: 2024-03-23 | End: 2024-03-23 | Stop reason: SDUPTHER

## 2024-03-23 RX ORDER — ONDANSETRON 4 MG/1
4 TABLET, ORALLY DISINTEGRATING ORAL EVERY 6 HOURS PRN
Status: DISCONTINUED | OUTPATIENT
Start: 2024-03-23 | End: 2024-03-23

## 2024-03-23 RX ORDER — VENLAFAXINE HYDROCHLORIDE 37.5 MG/1
75 CAPSULE, EXTENDED RELEASE ORAL
Status: DISCONTINUED | OUTPATIENT
Start: 2024-03-23 | End: 2024-03-23

## 2024-03-23 RX ORDER — SODIUM CHLORIDE 0.9 % (FLUSH) 0.9 %
5-40 SYRINGE (ML) INJECTION EVERY 12 HOURS SCHEDULED
Status: DISCONTINUED | OUTPATIENT
Start: 2024-03-23 | End: 2024-03-26 | Stop reason: HOSPADM

## 2024-03-23 RX ORDER — FERROUS SULFATE 325(65) MG
325 TABLET ORAL EVERY OTHER DAY
Status: DISCONTINUED | OUTPATIENT
Start: 2024-03-23 | End: 2024-03-26 | Stop reason: HOSPADM

## 2024-03-23 RX ORDER — SODIUM CHLORIDE, SODIUM LACTATE, POTASSIUM CHLORIDE, CALCIUM CHLORIDE 600; 310; 30; 20 MG/100ML; MG/100ML; MG/100ML; MG/100ML
INJECTION, SOLUTION INTRAVENOUS CONTINUOUS
Status: DISCONTINUED | OUTPATIENT
Start: 2024-03-23 | End: 2024-03-23

## 2024-03-23 RX ORDER — LIDOCAINE HYDROCHLORIDE AND EPINEPHRINE BITARTRATE 20; .01 MG/ML; MG/ML
INJECTION, SOLUTION SUBCUTANEOUS PRN
Status: DISCONTINUED | OUTPATIENT
Start: 2024-03-23 | End: 2024-03-23 | Stop reason: SDUPTHER

## 2024-03-23 RX ORDER — IBUPROFEN 800 MG/1
800 TABLET ORAL EVERY 8 HOURS SCHEDULED
Status: DISCONTINUED | OUTPATIENT
Start: 2024-03-23 | End: 2024-03-26 | Stop reason: HOSPADM

## 2024-03-23 RX ORDER — SODIUM CHLORIDE 9 MG/ML
INJECTION, SOLUTION INTRAVENOUS PRN
Status: DISCONTINUED | OUTPATIENT
Start: 2024-03-23 | End: 2024-03-26 | Stop reason: HOSPADM

## 2024-03-23 RX ORDER — NALBUPHINE HYDROCHLORIDE 20 MG/ML
20 INJECTION, SOLUTION INTRAMUSCULAR; INTRAVENOUS; SUBCUTANEOUS
Status: DISCONTINUED | OUTPATIENT
Start: 2024-03-23 | End: 2024-03-23

## 2024-03-23 RX ORDER — ONDANSETRON 4 MG/1
4 TABLET, ORALLY DISINTEGRATING ORAL EVERY 6 HOURS PRN
Status: DISCONTINUED | OUTPATIENT
Start: 2024-03-23 | End: 2024-03-23 | Stop reason: SDUPTHER

## 2024-03-23 RX ORDER — VENLAFAXINE 37.5 MG/1
75 TABLET ORAL DAILY
Status: DISCONTINUED | OUTPATIENT
Start: 2024-03-23 | End: 2024-03-23 | Stop reason: SDUPTHER

## 2024-03-23 RX ORDER — ONDANSETRON 2 MG/ML
4 INJECTION INTRAMUSCULAR; INTRAVENOUS EVERY 6 HOURS PRN
Status: DISCONTINUED | OUTPATIENT
Start: 2024-03-23 | End: 2024-03-23

## 2024-03-23 RX ORDER — NALBUPHINE HYDROCHLORIDE 20 MG/ML
10 INJECTION, SOLUTION INTRAMUSCULAR; INTRAVENOUS; SUBCUTANEOUS
Status: COMPLETED | OUTPATIENT
Start: 2024-03-23 | End: 2024-03-23

## 2024-03-23 RX ORDER — ACETAMINOPHEN 500 MG
1000 TABLET ORAL EVERY 8 HOURS SCHEDULED
Status: DISCONTINUED | OUTPATIENT
Start: 2024-03-23 | End: 2024-03-26 | Stop reason: HOSPADM

## 2024-03-23 RX ADMIN — SODIUM CHLORIDE, POTASSIUM CHLORIDE, SODIUM LACTATE AND CALCIUM CHLORIDE: 600; 310; 30; 20 INJECTION, SOLUTION INTRAVENOUS at 18:27

## 2024-03-23 RX ADMIN — ONDANSETRON 4 MG: 2 INJECTION INTRAMUSCULAR; INTRAVENOUS at 03:32

## 2024-03-23 RX ADMIN — LIDOCAINE HYDROCHLORIDE AND EPINEPHRINE 3 ML: 20; 10 INJECTION, SOLUTION INFILTRATION; PERINEURAL at 12:45

## 2024-03-23 RX ADMIN — BENZOCAINE AND LEVOMENTHOL: 200; 5 SPRAY TOPICAL at 22:14

## 2024-03-23 RX ADMIN — LIDOCAINE HYDROCHLORIDE 1 ML: 10 INJECTION, SOLUTION EPIDURAL; INFILTRATION; INTRACAUDAL; PERINEURAL at 14:50

## 2024-03-23 RX ADMIN — LIDOCAINE HYDROCHLORIDE AND EPINEPHRINE 3 ML: 20; 10 INJECTION, SOLUTION INFILTRATION; PERINEURAL at 12:07

## 2024-03-23 RX ADMIN — BUPIVACAINE HYDROCHLORIDE 5 ML: 5 INJECTION, SOLUTION EPIDURAL; INTRACAUDAL; PERINEURAL at 12:53

## 2024-03-23 RX ADMIN — MAGNESIUM SULFATE HEPTAHYDRATE 2000 MG/HR: 40 INJECTION, SOLUTION INTRAVENOUS at 06:13

## 2024-03-23 RX ADMIN — Medication 87.3 MILLI-UNITS/MIN: at 20:02

## 2024-03-23 RX ADMIN — BUPIVACAINE HYDROCHLORIDE 5 ML: 5 INJECTION, SOLUTION EPIDURAL; INTRACAUDAL; PERINEURAL at 12:48

## 2024-03-23 RX ADMIN — BUPIVACAINE HYDROCHLORIDE 5 ML: 5 INJECTION, SOLUTION EPIDURAL; INTRACAUDAL; PERINEURAL at 14:52

## 2024-03-23 RX ADMIN — SODIUM CHLORIDE, POTASSIUM CHLORIDE, SODIUM LACTATE AND CALCIUM CHLORIDE: 600; 310; 30; 20 INJECTION, SOLUTION INTRAVENOUS at 11:52

## 2024-03-23 RX ADMIN — ONDANSETRON 4 MG: 2 INJECTION INTRAMUSCULAR; INTRAVENOUS at 12:58

## 2024-03-23 RX ADMIN — EPHEDRINE SULFATE 5 MG: 50 INJECTION INTRAVENOUS at 15:29

## 2024-03-23 RX ADMIN — BUPIVACAINE HYDROCHLORIDE 6 ML: 5 INJECTION, SOLUTION EPIDURAL; INTRACAUDAL; PERINEURAL at 12:09

## 2024-03-23 RX ADMIN — Medication 15 ML/HR: at 12:14

## 2024-03-23 RX ADMIN — EPHEDRINE SULFATE 10 MG: 50 INJECTION INTRAVENOUS at 15:20

## 2024-03-23 RX ADMIN — BUPIVACAINE HYDROCHLORIDE 5 ML: 5 INJECTION, SOLUTION EPIDURAL; INTRACAUDAL; PERINEURAL at 18:11

## 2024-03-23 RX ADMIN — LIDOCAINE HYDROCHLORIDE 2 ML: 10 INJECTION, SOLUTION INFILTRATION; PERINEURAL at 12:43

## 2024-03-23 RX ADMIN — Medication 166.7 ML: at 19:38

## 2024-03-23 RX ADMIN — VENLAFAXINE HYDROCHLORIDE 75 MG: 37.5 CAPSULE, EXTENDED RELEASE ORAL at 07:39

## 2024-03-23 RX ADMIN — EPHEDRINE SULFATE 10 MG: 50 INJECTION INTRAVENOUS at 15:25

## 2024-03-23 RX ADMIN — MAGNESIUM SULFATE HEPTAHYDRATE 2000 MG/HR: 40 INJECTION, SOLUTION INTRAVENOUS at 16:27

## 2024-03-23 RX ADMIN — BUPIVACAINE HYDROCHLORIDE 5 ML: 5 INJECTION, SOLUTION EPIDURAL; INTRACAUDAL; PERINEURAL at 14:57

## 2024-03-23 RX ADMIN — EPHEDRINE SULFATE 25 MG: 50 INJECTION INTRAVENOUS at 15:20

## 2024-03-23 RX ADMIN — LIDOCAINE HYDROCHLORIDE 2 ML: 10 INJECTION, SOLUTION INFILTRATION; PERINEURAL at 11:56

## 2024-03-23 RX ADMIN — SODIUM CHLORIDE, POTASSIUM CHLORIDE, SODIUM LACTATE AND CALCIUM CHLORIDE: 600; 310; 30; 20 INJECTION, SOLUTION INTRAVENOUS at 03:36

## 2024-03-23 RX ADMIN — ACETAMINOPHEN 1000 MG: 500 TABLET ORAL at 22:14

## 2024-03-23 RX ADMIN — SODIUM CHLORIDE, POTASSIUM CHLORIDE, SODIUM LACTATE AND CALCIUM CHLORIDE: 600; 310; 30; 20 INJECTION, SOLUTION INTRAVENOUS at 15:00

## 2024-03-23 RX ADMIN — NALBUPHINE HYDROCHLORIDE 10 MG: 20 INJECTION, SOLUTION INTRAMUSCULAR; INTRAVENOUS; SUBCUTANEOUS at 14:27

## 2024-03-23 ASSESSMENT — PAIN SCALES - GENERAL: PAINLEVEL_OUTOF10: 8

## 2024-03-23 NOTE — ANESTHESIA PROCEDURE NOTES
Epidural Block    Patient location during procedure: OB  Start time: 3/23/2024 12:43 PM  End time: 3/23/2024 12:45 PM  Reason for block: labor epidural  Staffing  Performed: resident/CRNA   Resident/CRNA: Rajni Mackay APRN - CRNA  Performed by: Rajni Mackay APRN - CRNA  Authorized by: Grabiel Wheeler MD    Epidural  Patient position: sitting  Prep: ChloraPrep  Patient monitoring: continuous pulse ox and frequent blood pressure checks  Approach: midline  Location: L2-3  Injection technique: STACY saline  Provider prep: mask and sterile gloves  Needle  Needle type: Tuohy   Needle gauge: 18 G  Needle insertion depth: 10 cm  Catheter type: multi-orifice  Catheter at skin depth: 13 cm  Test dose: negativeCatheter Secured: tegaderm and tape  Assessment  Hemodynamics: stable  Attempts: 1  Outcomes: uncomplicated and patient tolerated procedure well  Preanesthetic Checklist  Completed: patient identified, IV checked, site marked, risks and benefits discussed, surgical/procedural consents, equipment checked, pre-op evaluation, timeout performed, anesthesia consent given, oxygen available, monitors applied/VS acknowledged, fire risk safety assessment completed and verbalized and blood product R/B/A discussed and consented

## 2024-03-23 NOTE — FLOWSHEET NOTE
03/22/24 1905   Maternal Vitals (MEW-T)   Pulse 80   BP (!) 176/106     Dr. Simmons notified of pt's severe range BP via telephone. Pt denies HA, visual changes, epigastric pain. Labetalol 80 mg IV ordered, will start Magnesium.

## 2024-03-23 NOTE — PROGRESS NOTES
Pt starting to get uncomfortable again rating her pain 6/10. This RN notified CRNA. CRNA to go to bedside to redose pt. Will continue to monitor

## 2024-03-23 NOTE — PROGRESS NOTES
Department of Obstetrics and Gynecology  Labor and Delivery   Attending Progress Note      SUBJECTIVE:  28 y.o.  2 parity 1001 female at 37 weeks 5 days gestation with EDC 24 presented to L&D from office secondary to elevated blood pressures.  Patient admitted for persistent severe blood pressures/pre-eclampsia.  Upon arrival to L&D, patient treated with labetalol 20 mg IV.  This was followed by labetalol 40 mg IV.  Further dose of 80 mg IV administered.  No further medications given thus far--hydralazine anticipated if further elevations realized.   Urine PCR:  0.6.  Has noted increased swelling in feet, diarrhea and nausea.  Denies headache, vision changes and RUQ pain.    Pregnancy has been complicated by elevated blood pressure/severe pre-eclampsia, polyhydramnios (resolved), HSV, and depression   Cervical ripening balloon expelled at 23:41  Pitocin ongoing.       Fetal heart rate:       Baseline Heart Rate:  120        Accelerations:  present       Long Term Variability:  moderate       Decelerations:  absent         Contraction frequency: 2-4 minutes    Fetal Presentation:  Cephalic,     Membranes:  Intact    Cervix:           Dilation:  2 cm         Effacement:  80         Station:  -3         Consistency:  soft         Position:  posterior            DATA:  LAB REVIEW:    flor abnormal data CBC  Order: [id number]  Status: Final result       Visible to patient: Yes (not seen)       Next appt: 2024 at 01:30 PM in Obstetrics and Gynecology (Mariela Quiroz MD)    0 Result Notes            Component  Ref Range & Units 3/23/24 0316 3/22/24 1455 24 1422 10/4/23 1454 23 1511 22 0928 22   WBC  4.0 - 11.0 K/uL 10.1 6.4 6.4 5.3 4.9 15.7 High  8.7   RBC  4.00 - 5.20 M/uL 3.69 Low  3.68 Low  3.68 Low  3.95 Low  3.92 Low  3.63 Low  4.13   Hemoglobin  12.0 - 16.0 g/dL 10.9 Low  10.8 Low  11.4 Low  12.7 13.1 11.1 Low  12.8   Hematocrit  36.0 - 48.0 % 32.7 Low  32.4 Low  32.9 Low

## 2024-03-23 NOTE — PLAN OF CARE
Problem: Vaginal Birth or  Section  Goal: Fetal and maternal status remain reassuring during the birth process  Description:  Birth OB-Pregnancy care plan goal which identifies if the fetal and maternal status remain reassuring during the birth process  3/23/2024 0430 by April Amos RN  Outcome: Progressing  Flowsheets (Taken 3/22/2024 2301)  Fetal and Maternal Status Remain Reassuring During the Birth Process:   Monitor vital signs   Monitor fetal heart rate   Monitor uterine activity   Monitor labor progression (Vaginal delivery)  3/22/2024 1722 by Marycruz Hill RN  Outcome: Progressing     Problem: Pain  Goal: Verbalizes/displays adequate comfort level or baseline comfort level  3/23/2024 0430 by April Amos RN  Outcome: Progressing  3/22/2024 172 by Marycruz Hill, RN  Outcome: Progressing     Problem: Safety - Adult  Goal: Free from fall injury  3/23/2024 0430 by April Amos RN  Outcome: Progressing  3/22/2024 172 by Marycruz Hill RN  Outcome: Progressing

## 2024-03-23 NOTE — FLOWSHEET NOTE
03/22/24 2230 03/22/24 2248   Maternal Vitals (MEW-T)   Pulse 80 79   BP (!) 171/92 (!) 154/87     Dr. Simmons notified of severe range BP. Repeat mild range. Pt denies HA, visual changes, epigastic pain. No treatment at this time. Will continue monitoring.

## 2024-03-23 NOTE — PROGRESS NOTES
This RN placed NOVII on pt and only getting short periods of time for the FHR. RN remains at bedside hand holding US to get FHR. Dr Menard notified and on her way to unit to assess pt, possibly AROM and place internal monitors. Will continue to monitor. Brief periods of FHR is reassuring. Baby has been very active.

## 2024-03-23 NOTE — PROGRESS NOTES
[place] bulb came out at 2341- SVE completed at this time 3-4/60/-3. Pt tolerated exam well.

## 2024-03-23 NOTE — FLOWSHEET NOTE
03/23/24 0717   Handoff   Communication Given Shift Handoff   Handoff Given To April BRAGA RN   Handoff Received From April CARABALLO RN   Handoff Communication Face to Face;At bedside   Time Handoff Given 0717   End of Shift Check Performed Yes     Pt takes Effexor 75 mg daily for anxiety. KATE Cabrera RN aware, will discuss order with provider. Pt denies additional needs at this time.

## 2024-03-23 NOTE — ANESTHESIA PROCEDURE NOTES
CSE Block    Patient location during procedure: OB  Start time: 3/23/2024 2:42 PM  End time: 3/23/2024 2:55 PM  Reason for block: labor epidural  Staffing  Performed: anesthesiologist   Anesthesiologist: Grabiel Wheeler MD  Performed by: Rajni Mackay APRN - CRNA  Authorized by: Grabiel Wheeler MD    CSE  Patient position: sitting  Prep: ChloraPrep  Patient monitoring: continuous pulse ox and frequent blood pressure checks  Approach: midline  Provider prep: mask  Spinal Needle  Needle type: pencil-tip   Needle gauge: 25 G  Epidural Needle  Injection technique: STACY air  Needle type: Tuohy   Needle insertion depth: 8 cm  Location: lumbar (1-5)  Catheter  Catheter at skin depth: 13 cm  Test dose: negative  Assessment  Hemodynamics: stable  Preanesthetic Checklist  Completed: patient identified, IV checked, site marked, risks and benefits discussed, surgical/procedural consents, equipment checked, pre-op evaluation, timeout performed, anesthesia consent given, oxygen available, monitors applied/VS acknowledged, fire risk safety assessment completed and verbalized and blood product R/B/A discussed and consented

## 2024-03-23 NOTE — PROGRESS NOTES
CRNA remained at bedside administering ephedrine due to decrease in bp. Will continue to monitor.

## 2024-03-23 NOTE — PROGRESS NOTES
Unsuccessful epidural placement. Unable to achieve CSF flow on multiple spinal attempts, patient tolerated well. RN will proceed to dose patient with pain meds. Call placed to Dr. Grabiel Wheeler to obtain labor analgesia for this patient. Dr. Wheeler en route eta 3:50 pm.

## 2024-03-23 NOTE — PROGRESS NOTES
Pt off monitor for extended period of time due to fetal movement. This RN remained at bedside and adjusted US. Dr Simmons on unit and aware of difficulty to monitor FHR.

## 2024-03-23 NOTE — PROGRESS NOTES
Dr Menard notified of pt decrease in bp, that pitocin was halved, SVE and that mora was placed. Will continue to monitor

## 2024-03-23 NOTE — ANESTHESIA PRE PROCEDURE
Department of Anesthesiology  Preprocedure Note       Name:  Savannah Ontiveros   Age:  28 y.o.  :  1995                                          MRN:  [id number]         Date:  3/23/2024      Surgeon: * No surgeons listed *    Procedure: * No procedures listed *    Medications prior to admission:   Prior to Admission medications    Medication Sig Start Date End Date Taking? Authorizing Provider   Prenatal MV-Min-Fe Fum-FA-DHA (PRENATAL 1 PO) Take 1 tablet by mouth daily   Yes ProviderGenia MD   acyclovir (ZOVIRAX) 400 MG tablet Take 1 tablet by mouth 3 times daily 3/15/24 4/14/24  Mariela Quiroz MD   promethazine (PHENERGAN) 12.5 MG tablet TAKE 1 TABLET BY MOUTH 4 TIMES A DAY AS NEEDED FOR NAUSEA 3/14/24   Mariela Quiroz MD   venlafaxine (EFFEXOR) 75 MG tablet Take 1 tablet by mouth daily    ProviderGenia MD       Current medications:    Current Facility-Administered Medications   Medication Dose Route Frequency Provider Last Rate Last Admin    ondansetron (ZOFRAN) injection 4 mg  4 mg IntraVENous Q6H PRN Aundrea Simmons DO        Or    ondansetron (ZOFRAN-ODT) disintegrating tablet 4 mg  4 mg Oral Q6H PRN Aundrea Simmons DO        oxytocin (PITOCIN) 30 units in 500 mL infusion  87.3 wendy-units/min IntraVENous Continuous PRN Aundrea Simmons DO        And    oxytocin (PITOCIN) 10 unit bolus from the bag  10 Units IntraVENous PRN Aundrea Simmons DO        lactated ringers IV soln infusion   IntraVENous Continuous Aundrea Simmons DO        venlafaxine (EFFEXOR XR) extended release capsule 75 mg  75 mg Oral Daily with breakfast Aundrea Simmons DO   75 mg at 24 0739    lactated ringers IV soln infusion   IntraVENous Continuous Aundrea Simmons  mL/hr at 24 1200 Rate Verify at 24 1200    labetalol (NORMODYNE;TRANDATE) injection 80 mg  80 mg IntraVENous Once Aundrea Simmons DO        acetaminophen (TYLENOL) tablet 650 mg

## 2024-03-23 NOTE — PLAN OF CARE
Problem: Vaginal Birth or  Section  Goal: Fetal and maternal status remain reassuring during the birth process  Description:  Birth OB-Pregnancy care plan goal which identifies if the fetal and maternal status remain reassuring during the birth process  3/23/2024 0754 by April Cabrera  Outcome: Progressing  3/23/2024 0430 by April Amos RN  Outcome: Progressing  Flowsheets (Taken 3/22/2024 2301)  Fetal and Maternal Status Remain Reassuring During the Birth Process:   Monitor vital signs   Monitor fetal heart rate   Monitor uterine activity   Monitor labor progression (Vaginal delivery)     Problem: Pain  Goal: Verbalizes/displays adequate comfort level or baseline comfort level  3/23/2024 0754 by April Cabrera  Outcome: Progressing  3/23/2024 0430 by April Amos RN  Outcome: Progressing     Problem: Safety - Adult  Goal: Free from fall injury  3/23/2024 0754 by April Cabrera  Outcome: Progressing  3/23/2024 0430 by April Amos RN  Outcome: Progressing     Problem: Risk for Maternal Injury  Goal: Remains free from seizures and injury related to seizure activity  Description: INTERVENTIONS:.  -Maintain airway, patient safety and administer oxygen as ordered  -Monitor patient for seizure activity, document and report duration and descrition  -If Seizure occurs, turn patient to dide and suction secretions as needed  -Reorient patient post seizure  -Seizure Pads  -Instruct patient/family to notify RN of any seizure activity  -Instruct patient/family to call for assistance with activity based on assessment  Outcome: Progressing     Problem: Risk for Decreased Cardiac Output  Goal: Maintains optimal cardiac output and hemodynamic stability  Description: INTERVENTIONS:.  -Monitor blood pressure and heart rate  -Monitor urine output and notify licensed practitioner for values outside of   -Assess for signes of decreased cardiac output  -Administer fluid and /or

## 2024-03-23 NOTE — ANESTHESIA PROCEDURE NOTES
Epidural Block    Patient location during procedure: OB  Start time: 3/23/2024 11:56 AM  End time: 3/23/2024 12:04 PM  Reason for block: labor epidural  Staffing  Performed: resident/CRNA   Resident/CRNA: Rajni Mackay APRN - CRNA  Performed by: Rajni Mackay APRN - CRNA  Authorized by: Grabiel Wheeler MD    Epidural  Patient position: sitting  Prep: ChloraPrep  Patient monitoring: continuous pulse ox and frequent blood pressure checks  Approach: midline  Location: L3-4  Injection technique: STACY air  Provider prep: mask and sterile gloves  Needle  Needle type: Tuohy   Needle gauge: 18 G  Needle insertion depth: 10 cm  Catheter type: multi-orifice  Catheter at skin depth: 12.5 cm  Test dose: negativeCatheter Secured: tegaderm  Assessment  Events: failed epidural  Hemodynamics: stable  Attempts: 1  Outcomes: uncomplicated and patient tolerated procedure well  Additional Notes  Epidural removed at 1242, no relief by patient.  Preanesthetic Checklist  Completed: patient identified, IV checked, site marked, risks and benefits discussed, surgical/procedural consents, equipment checked, pre-op evaluation, timeout performed, anesthesia consent given, oxygen available, monitors applied/VS acknowledged, fire risk safety assessment completed and verbalized and blood product R/B/A discussed and consented

## 2024-03-23 NOTE — PROGRESS NOTES
Brief Labor note:     28 y.o.  @ 37w5d, here for MIOL due to Pre-Eclampsia  On mag for seizure prophylaxis    Pt seen and examined. Doing well.   Denies HA / vision changes, RUQ pain or worsening edema at this time.     FHT:   125 bpm   mod variability  (-) accels, (-) decels   Contractions q 1-2 min     Cat 1 / NR after AROM   (Reactive prior to this)    VE:    3/80/-3  ? Thin mec fluid, will cont to observe     IV Pitocin: 15 mU     Plan:  Continue IV Pitocin per protocol   Reassuring fetal status   Continuous fetal monitoring     Please call with questions or concerns   Silvia Menard, DO

## 2024-03-24 LAB
ALBUMIN SERPL-MCNC: 2.9 G/DL (ref 3.4–5)
ALBUMIN SERPL-MCNC: 3.1 G/DL (ref 3.4–5)
ALBUMIN/GLOB SERPL: 1 {RATIO} (ref 1.1–2.2)
ALBUMIN/GLOB SERPL: 1.1 {RATIO} (ref 1.1–2.2)
ALP SERPL-CCNC: 137 U/L (ref 40–129)
ALP SERPL-CCNC: 168 U/L (ref 40–129)
ALT SERPL-CCNC: 13 U/L (ref 10–40)
ALT SERPL-CCNC: 14 U/L (ref 10–40)
ANION GAP SERPL CALCULATED.3IONS-SCNC: 10 MMOL/L (ref 3–16)
ANION GAP SERPL CALCULATED.3IONS-SCNC: 11 MMOL/L (ref 3–16)
AST SERPL-CCNC: 20 U/L (ref 15–37)
AST SERPL-CCNC: 23 U/L (ref 15–37)
BILIRUB SERPL-MCNC: 0.4 MG/DL (ref 0–1)
BILIRUB SERPL-MCNC: <0.2 MG/DL (ref 0–1)
BUN SERPL-MCNC: 10 MG/DL (ref 7–20)
BUN SERPL-MCNC: 6 MG/DL (ref 7–20)
CALCIUM SERPL-MCNC: 7.1 MG/DL (ref 8.3–10.6)
CALCIUM SERPL-MCNC: 7.2 MG/DL (ref 8.3–10.6)
CHLORIDE SERPL-SCNC: 101 MMOL/L (ref 99–110)
CHLORIDE SERPL-SCNC: 98 MMOL/L (ref 99–110)
CO2 SERPL-SCNC: 23 MMOL/L (ref 21–32)
CO2 SERPL-SCNC: 23 MMOL/L (ref 21–32)
CREAT SERPL-MCNC: 0.6 MG/DL (ref 0.6–1.1)
CREAT SERPL-MCNC: 0.7 MG/DL (ref 0.6–1.1)
DEPRECATED RDW RBC AUTO: 13.5 % (ref 12.4–15.4)
DEPRECATED RDW RBC AUTO: 13.9 % (ref 12.4–15.4)
GFR SERPLBLD CREATININE-BSD FMLA CKD-EPI: >60 ML/MIN/{1.73_M2}
GFR SERPLBLD CREATININE-BSD FMLA CKD-EPI: >60 ML/MIN/{1.73_M2}
GLUCOSE SERPL-MCNC: 86 MG/DL (ref 70–99)
GLUCOSE SERPL-MCNC: 98 MG/DL (ref 70–99)
HCT VFR BLD AUTO: 29.3 % (ref 36–48)
HCT VFR BLD AUTO: 31.7 % (ref 36–48)
HGB BLD-MCNC: 10.5 G/DL (ref 12–16)
HGB BLD-MCNC: 9.5 G/DL (ref 12–16)
LDH SERPL L TO P-CCNC: 299 U/L (ref 100–190)
LDH SERPL L TO P-CCNC: 331 U/L (ref 100–190)
MAGNESIUM SERPL-MCNC: 5.1 MG/DL (ref 1.8–2.4)
MAGNESIUM SERPL-MCNC: 5.8 MG/DL (ref 1.8–2.4)
MCH RBC QN AUTO: 28.8 PG (ref 26–34)
MCH RBC QN AUTO: 29.5 PG (ref 26–34)
MCHC RBC AUTO-ENTMCNC: 32.4 G/DL (ref 31–36)
MCHC RBC AUTO-ENTMCNC: 33.1 G/DL (ref 31–36)
MCV RBC AUTO: 88.9 FL (ref 80–100)
MCV RBC AUTO: 88.9 FL (ref 80–100)
PLATELET # BLD AUTO: 242 K/UL (ref 135–450)
PLATELET # BLD AUTO: 243 K/UL (ref 135–450)
PMV BLD AUTO: 8.3 FL (ref 5–10.5)
PMV BLD AUTO: 8.8 FL (ref 5–10.5)
POTASSIUM SERPL-SCNC: 4 MMOL/L (ref 3.5–5.1)
POTASSIUM SERPL-SCNC: 4 MMOL/L (ref 3.5–5.1)
PROT SERPL-MCNC: 5.7 G/DL (ref 6.4–8.2)
PROT SERPL-MCNC: 6 G/DL (ref 6.4–8.2)
RBC # BLD AUTO: 3.29 M/UL (ref 4–5.2)
RBC # BLD AUTO: 3.57 M/UL (ref 4–5.2)
SODIUM SERPL-SCNC: 132 MMOL/L (ref 136–145)
SODIUM SERPL-SCNC: 134 MMOL/L (ref 136–145)
URATE SERPL-MCNC: 6.2 MG/DL (ref 2.6–6)
URATE SERPL-MCNC: 6.2 MG/DL (ref 2.6–6)
WBC # BLD AUTO: 8.5 K/UL (ref 4–11)
WBC # BLD AUTO: 9.7 K/UL (ref 4–11)

## 2024-03-24 PROCEDURE — 83735 ASSAY OF MAGNESIUM: CPT

## 2024-03-24 PROCEDURE — 99024 POSTOP FOLLOW-UP VISIT: CPT | Performed by: OBSTETRICS & GYNECOLOGY

## 2024-03-24 PROCEDURE — 6360000002 HC RX W HCPCS

## 2024-03-24 PROCEDURE — 85027 COMPLETE CBC AUTOMATED: CPT

## 2024-03-24 PROCEDURE — 6370000000 HC RX 637 (ALT 250 FOR IP): Performed by: OBSTETRICS & GYNECOLOGY

## 2024-03-24 PROCEDURE — 36415 COLL VENOUS BLD VENIPUNCTURE: CPT

## 2024-03-24 PROCEDURE — 83615 LACTATE (LD) (LDH) ENZYME: CPT

## 2024-03-24 PROCEDURE — 80053 COMPREHEN METABOLIC PANEL: CPT

## 2024-03-24 PROCEDURE — 84550 ASSAY OF BLOOD/URIC ACID: CPT

## 2024-03-24 PROCEDURE — 6360000002 HC RX W HCPCS: Performed by: OBSTETRICS & GYNECOLOGY

## 2024-03-24 PROCEDURE — 1220000000 HC SEMI PRIVATE OB R&B

## 2024-03-24 RX ORDER — VENLAFAXINE HYDROCHLORIDE 37.5 MG/1
75 CAPSULE, EXTENDED RELEASE ORAL
Status: DISCONTINUED | OUTPATIENT
Start: 2024-03-25 | End: 2024-03-24

## 2024-03-24 RX ORDER — LABETALOL 200 MG/1
200 TABLET, FILM COATED ORAL EVERY 12 HOURS SCHEDULED
Status: DISCONTINUED | OUTPATIENT
Start: 2024-03-24 | End: 2024-03-26 | Stop reason: HOSPADM

## 2024-03-24 RX ORDER — VENLAFAXINE HYDROCHLORIDE 37.5 MG/1
75 CAPSULE, EXTENDED RELEASE ORAL
Status: DISCONTINUED | OUTPATIENT
Start: 2024-03-24 | End: 2024-03-26 | Stop reason: HOSPADM

## 2024-03-24 RX ORDER — SODIUM CHLORIDE, SODIUM LACTATE, POTASSIUM CHLORIDE, CALCIUM CHLORIDE 600; 310; 30; 20 MG/100ML; MG/100ML; MG/100ML; MG/100ML
INJECTION, SOLUTION INTRAVENOUS CONTINUOUS
Status: DISCONTINUED | OUTPATIENT
Start: 2024-03-24 | End: 2024-03-26 | Stop reason: HOSPADM

## 2024-03-24 RX ADMIN — IBUPROFEN 800 MG: 800 TABLET, FILM COATED ORAL at 04:21

## 2024-03-24 RX ADMIN — MAGNESIUM SULFATE HEPTAHYDRATE 2000 MG/HR: 40 INJECTION, SOLUTION INTRAVENOUS at 02:45

## 2024-03-24 RX ADMIN — ACETAMINOPHEN 1000 MG: 500 TABLET ORAL at 16:33

## 2024-03-24 RX ADMIN — VENLAFAXINE HYDROCHLORIDE 75 MG: 37.5 CAPSULE, EXTENDED RELEASE ORAL at 13:17

## 2024-03-24 RX ADMIN — IBUPROFEN 800 MG: 800 TABLET, FILM COATED ORAL at 12:23

## 2024-03-24 RX ADMIN — MAGNESIUM SULFATE HEPTAHYDRATE 2000 MG/HR: 40 INJECTION, SOLUTION INTRAVENOUS at 13:13

## 2024-03-24 RX ADMIN — IBUPROFEN 800 MG: 800 TABLET, FILM COATED ORAL at 20:39

## 2024-03-24 RX ADMIN — LABETALOL HYDROCHLORIDE 200 MG: 200 TABLET, FILM COATED ORAL at 16:25

## 2024-03-24 RX ADMIN — ACETAMINOPHEN 1000 MG: 500 TABLET ORAL at 07:00

## 2024-03-24 RX ADMIN — DOCUSATE SODIUM 100 MG: 100 CAPSULE, LIQUID FILLED ORAL at 09:08

## 2024-03-24 ASSESSMENT — PAIN - FUNCTIONAL ASSESSMENT
PAIN_FUNCTIONAL_ASSESSMENT: ACTIVITIES ARE NOT PREVENTED
PAIN_FUNCTIONAL_ASSESSMENT: ACTIVITIES ARE NOT PREVENTED

## 2024-03-24 ASSESSMENT — PAIN SCALES - GENERAL
PAINLEVEL_OUTOF10: 0

## 2024-03-24 ASSESSMENT — PAIN DESCRIPTION - DESCRIPTORS: DESCRIPTORS: ACHING

## 2024-03-24 ASSESSMENT — PAIN DESCRIPTION - LOCATION: LOCATION: ABDOMEN

## 2024-03-24 NOTE — PROGRESS NOTES
RN remained at bedside throughout pushing.  EFM continuously assessed.  Vaginal delivery of viable infant.   Dr Menard remained at bedside

## 2024-03-24 NOTE — PROGRESS NOTES
Patient actively pushing.  RN remains in continuous attendance at the bedside.  Assessment & evaluation of fetal heart rate ongoing via continuous EFM.  Dr Menard at bedside

## 2024-03-24 NOTE — L&D DELIVERY SUMMARY NOTE
[facility]stetrics and Gynecology  Spontaneous Vaginal Delivery Note        Pre-operative Diagnosis:    28 y.o.  at 37w5d EGA   MIOL for Pre-Eclampsia with SF   A (+) / RI / GBS (-)   Cat 2 Tracing in labor    Anemia  Obesity   HSV   Depression     Post-operative Diagnosis:   Same   1st degree laceration              Anesthesia:  Epidural     Admission and Delivery:       Patient presented to [facility] [place] [facility] for MIOL due to PE w/ SF.  She received FBC, IV Pitocin, AROM, Internal monitors and Epidural for comfort. Required 4 attempts for epidural.       Patient progressed spontaneously to complete cervical dilation. Cat 2 FHT noted. Pt counseled on possibility of both PCS or Vacuum assistance if needed. Began pushing and with good maternal effort. The infant was delivered in the TANISHA position over intact perineum. Nuchal cord absent. The shoulders and body were quickly to follow with maternal efforts. Infant was delivered with good tone and spontaneous cry without complication. Mouth and nose were bulb suctioned at maternal abdomen. Delayed cord clamping, partner cut cord after 1 min as cord no longer pulsating. Infant was handed on to Counts include 234 beds at the [facility] staff for assessment and resuscitation -- see documentation. Cord segment and cord blood were obtained.  Cord gasses were collected.  APGARS were noted to be 7 and 9. Delivery Date and Time: 3/23/2024 1935 .      The placenta was delivered spontaneously with manual massage of the uterus and was noted to be intact with a 3 vessel cord. Pitocin was started immediately after placenta delivery. Bleeding noted to be appropriate.     1st degree laceration was noted and repaired with 2-0 Vicryl.      The patient tolerated well and is in stable condition following delivery.      EBL: 250 ml     Infant Wt: Pending     APGARS: 7, 9      Specimen:  Placenta / Cord segment      Condition:  infant stable and mother stable      Attending Attestation: I performed the  procedure.  Silvia Menard DO

## 2024-03-24 NOTE — PROGRESS NOTES
Pt's support person left for the night. Discussed that pt needs a support person in the room being on magnesium. Pt's support person unable to return to the facility. Pt aware infant will need to be taken to nursery for the night until support person is able to return.

## 2024-03-24 NOTE — PROGRESS NOTES
[facility] Ob/[facility]yn  Post Partum Progress Note    Subjective:   Patient is a 28 y.o. y/o  female S/P  at 37w5d EGA. PPD #1.     Pregnancy has been complicated by elevated blood pressure, polyhydramnios (resolved), HSV, and depression.      Doing well today. No current complaints are noted including headache, change in vision, fever, chills, chest pain, shortness of breath, nausea, vomiting, diarrhea or constipation. The patient denies dizziness with ambulation or calf tenderness. Voiding spontaneously. Lochia is described as minimal. The patient plans to breastfeed.    Objective:   GENERAL APPEARANCE: alert, well appearing, in no apparent distress  LUNGS: Resp effort normal   HEART: Reg rate   ABDOMEN POSTPARTUM: benign non-tender, without masses or organomegaly palpable . Uterine Fundus firm, NT, Below umbilicus.   EXTREMITIES: no redness or tenderness in the calves or thighs, no edema  SKIN: normal coloration and turgor, no rashes, no suspicious skin lesions noted    Pertinent Labs:   Cr: 0.5 > 0.6   AST/ALT     > 331    > 242   UA 4.8 > 6.2   UPC 0.6 on admission     Assessment / Plan:  1) Post partum    - meeting post partum milestones    - hemodynamically stable     2) A+ / RI / GBS (-)    3) Baby Information    - Delivery Time / Date: 3/23/2024 1935    - APGARS: 7, 8    - Birth weight: 3.225 kg (7 lb 1.8 oz)    - Feeding: Breast    - Gender: Male -- family requested circumcision     4) Pre-Eclampsia with SF    - currently on mag for seizure prophylaxis   - asymptomatic currently   - labs noted above, increase in Cr, LDH otherwise nml   - will cont q6 hr + mag   - last mag 5.80, cont current dosing   - cont seizure precautions   - suspect will need PO meds after mag, will plan for Labetalol If needed      5) Depression / anxiety    - restarted effexor at 75 mg daily    - denies mood issues so far / well supported     Disposition:   Post Partum Instructions reviewed   Anticipate  discharge on PPD # 2-4 pending clinical status     Silvia Menard DO

## 2024-03-24 NOTE — LACTATION NOTE
This note was copied from a baby's chart.  Lactation Progress Note      Data:    Initial consult for multip on day 1 pp with an infant born at 37.4 weeks gestation. MOB tried to breastfeed her first but struggle with latching and states her milk never came in. Tried for a few days then switched to formula. ALEXANDR reports current infant has been latching ok but is very sleepy. MOB was on Mag and infant was in SCN overnight, received a couple bottles of formula.     Action:    Introduced self to patient as lactation, name and phone number written on white board in room. Educated that if MOB gives formula again, we would want her to use breast pump to protect milk supply. MOB states it is about time to try, offered assistance, MOB agreeable. Educated mother about cross cradle & football positions, how to support infants head, how to support the breast, and steps for a ELADIO. MOB desires to use football position. Assisted with positioning infant and guided mothers hands for a ELADIO, SRS w/ gentle encouragement. I observed 17 minutes of feed. Infant still at the breast after consult ended.     MOB noted to have wide set tubular breasts, and had minimal breast changes during pregnancy.     Educated mother about what to expect over with infant feedings, infant output, how to know infant is getting enough, the importance of a deep latch and how to achieve it, how to break suction and try again if latch is shallow, normal  behavior, how to wake a sleepy infant to feed, how the breasts work to make milk, protecting milk supply, breastfeeding recommendations for exclusivity and duration, what to expect with cluster feeding, how to hand express colostrum, and breast care.     Educated about infant feeding cues and encouraged mother to allow infant to breast feed on demand anytime feeding cues are shown and if no feeding cues are shown to attempt to wake infant to feed every 2-3 hours. If infant is still too sleepy to latch to

## 2024-03-24 NOTE — PLAN OF CARE
Problem: Vaginal Birth or  Section  Goal: Fetal and maternal status remain reassuring during the birth process  Description:  Birth OB-Pregnancy care plan goal which identifies if the fetal and maternal status remain reassuring during the birth process  Outcome: Progressing     Problem: Pain  Goal: Verbalizes/displays adequate comfort level or baseline comfort level  Outcome: Progressing     Problem: Safety - Adult  Goal: Free from fall injury  Outcome: Progressing     Problem: Risk for Maternal Injury  Goal: Remains free from seizures and injury related to seizure activity  Description: INTERVENTIONS:.  -Maintain airway, patient safety and administer oxygen as ordered  -Monitor patient for seizure activity, document and report duration and descrition  -If Seizure occurs, turn patient to dide and suction secretions as needed  -Reorient patient post seizure  -Seizure Pads  -Instruct patient/family to notify RN of any seizure activity  -Instruct patient/family to call for assistance with activity based on assessment  Outcome: Progressing     Problem: Risk for Decreased Cardiac Output  Goal: Maintains optimal cardiac output and hemodynamic stability  Description: INTERVENTIONS:.  -Monitor blood pressure and heart rate  -Monitor urine output and notify licensed practitioner for values outside of   -Assess for signes of decreased cardiac output  -Administer fluid and /or volume expanders as ordered    Outcome: Progressing  Goal: Achieves optimal ventilation and oxygenation  Description: INTERVENTIONS:.  -Assess for changes in respiratory status   -Assess for changes in mentation and behavior  -Position to facilitate oxygenation and minimize respiratory effort  -Oxygen supplementation based on oxygen saturation or arterial blood gases  -Initiate smoking cessation protocol as indicated  -Encourage broncho-pulmonary hygiene including cough, deep breathe, incentive spirometry  -Assess the need for suctioning and  aspirate as needed  -Assess and instruct to report shortness of breath or any respiratory difficulty  -Respiratory therapy support as indicated      Outcome: Progressing     Problem: Risk for Deficient Fluid Volume  Goal: Hemodynamic stability and optimal renal function maintained  Description: Interventions:.  -Monitor labs and assess for signs and symptoms of volume excess or deficit    -Monitor intake, output and patient weight  -Monitor response to interventions for patient's volume status, including labs, urine output, blood pressure (other measures as available)  -Fluid restriction as ordered  -Instruct patient on fluid and nutrition restrictions as appropriate      Outcome: Progressing

## 2024-03-24 NOTE — PROGRESS NOTES
Pt assisted by 2 staff members to restroom for first time get up. Pt denies dizziness or lightheadedness. Gait steady. Pt voided 750 mL urine without difficulty. Pericare done by pt with RN instruction. New ice pack, pad and panties put on pt. Gown changed. Hand hygiene done. Complete linen change done and comfort pad put on bed. Pt tolerated well.

## 2024-03-25 LAB
ALBUMIN SERPL-MCNC: 2.8 G/DL (ref 3.4–5)
ALBUMIN SERPL-MCNC: 3 G/DL (ref 3.4–5)
ALBUMIN/GLOB SERPL: 0.9 {RATIO} (ref 1.1–2.2)
ALBUMIN/GLOB SERPL: 1.1 {RATIO} (ref 1.1–2.2)
ALP SERPL-CCNC: 126 U/L (ref 40–129)
ALP SERPL-CCNC: 129 U/L (ref 40–129)
ALT SERPL-CCNC: 14 U/L (ref 10–40)
ALT SERPL-CCNC: 15 U/L (ref 10–40)
ANION GAP SERPL CALCULATED.3IONS-SCNC: 10 MMOL/L (ref 3–16)
ANION GAP SERPL CALCULATED.3IONS-SCNC: 9 MMOL/L (ref 3–16)
AST SERPL-CCNC: 19 U/L (ref 15–37)
AST SERPL-CCNC: 23 U/L (ref 15–37)
BILIRUB SERPL-MCNC: <0.2 MG/DL (ref 0–1)
BILIRUB SERPL-MCNC: <0.2 MG/DL (ref 0–1)
BUN SERPL-MCNC: 9 MG/DL (ref 7–20)
BUN SERPL-MCNC: 9 MG/DL (ref 7–20)
CALCIUM SERPL-MCNC: 7.5 MG/DL (ref 8.3–10.6)
CALCIUM SERPL-MCNC: 7.8 MG/DL (ref 8.3–10.6)
CHLORIDE SERPL-SCNC: 102 MMOL/L (ref 99–110)
CHLORIDE SERPL-SCNC: 104 MMOL/L (ref 99–110)
CO2 SERPL-SCNC: 24 MMOL/L (ref 21–32)
CO2 SERPL-SCNC: 26 MMOL/L (ref 21–32)
CREAT SERPL-MCNC: 0.6 MG/DL (ref 0.6–1.1)
CREAT SERPL-MCNC: 0.6 MG/DL (ref 0.6–1.1)
DEPRECATED RDW RBC AUTO: 13.9 % (ref 12.4–15.4)
DEPRECATED RDW RBC AUTO: 14 % (ref 12.4–15.4)
GFR SERPLBLD CREATININE-BSD FMLA CKD-EPI: >60 ML/MIN/{1.73_M2}
GFR SERPLBLD CREATININE-BSD FMLA CKD-EPI: >60 ML/MIN/{1.73_M2}
GLUCOSE SERPL-MCNC: 89 MG/DL (ref 70–99)
GLUCOSE SERPL-MCNC: 95 MG/DL (ref 70–99)
HCT VFR BLD AUTO: 28.4 % (ref 36–48)
HCT VFR BLD AUTO: 29 % (ref 36–48)
HGB BLD-MCNC: 9.4 G/DL (ref 12–16)
HGB BLD-MCNC: 9.6 G/DL (ref 12–16)
LDH SERPL L TO P-CCNC: 291 U/L (ref 100–190)
LDH SERPL L TO P-CCNC: 304 U/L (ref 100–190)
MAGNESIUM SERPL-MCNC: 2.6 MG/DL (ref 1.8–2.4)
MAGNESIUM SERPL-MCNC: 3.1 MG/DL (ref 1.8–2.4)
MCH RBC QN AUTO: 29.4 PG (ref 26–34)
MCH RBC QN AUTO: 29.4 PG (ref 26–34)
MCHC RBC AUTO-ENTMCNC: 33 G/DL (ref 31–36)
MCHC RBC AUTO-ENTMCNC: 33.2 G/DL (ref 31–36)
MCV RBC AUTO: 88.4 FL (ref 80–100)
MCV RBC AUTO: 89 FL (ref 80–100)
PLATELET # BLD AUTO: 228 K/UL (ref 135–450)
PLATELET # BLD AUTO: 230 K/UL (ref 135–450)
PMV BLD AUTO: 8.4 FL (ref 5–10.5)
PMV BLD AUTO: 8.4 FL (ref 5–10.5)
POTASSIUM SERPL-SCNC: 3.9 MMOL/L (ref 3.5–5.1)
POTASSIUM SERPL-SCNC: 4.3 MMOL/L (ref 3.5–5.1)
PROT SERPL-MCNC: 5.8 G/DL (ref 6.4–8.2)
PROT SERPL-MCNC: 5.8 G/DL (ref 6.4–8.2)
RBC # BLD AUTO: 3.21 M/UL (ref 4–5.2)
RBC # BLD AUTO: 3.25 M/UL (ref 4–5.2)
SODIUM SERPL-SCNC: 136 MMOL/L (ref 136–145)
SODIUM SERPL-SCNC: 139 MMOL/L (ref 136–145)
URATE SERPL-MCNC: 6.1 MG/DL (ref 2.6–6)
URATE SERPL-MCNC: 6.5 MG/DL (ref 2.6–6)
WBC # BLD AUTO: 6.8 K/UL (ref 4–11)
WBC # BLD AUTO: 7.1 K/UL (ref 4–11)

## 2024-03-25 PROCEDURE — 36415 COLL VENOUS BLD VENIPUNCTURE: CPT

## 2024-03-25 PROCEDURE — 85027 COMPLETE CBC AUTOMATED: CPT

## 2024-03-25 PROCEDURE — 84550 ASSAY OF BLOOD/URIC ACID: CPT

## 2024-03-25 PROCEDURE — 80053 COMPREHEN METABOLIC PANEL: CPT

## 2024-03-25 PROCEDURE — 6370000000 HC RX 637 (ALT 250 FOR IP): Performed by: OBSTETRICS & GYNECOLOGY

## 2024-03-25 PROCEDURE — 2580000003 HC RX 258: Performed by: OBSTETRICS & GYNECOLOGY

## 2024-03-25 PROCEDURE — 83735 ASSAY OF MAGNESIUM: CPT

## 2024-03-25 PROCEDURE — 83615 LACTATE (LD) (LDH) ENZYME: CPT

## 2024-03-25 PROCEDURE — 1220000000 HC SEMI PRIVATE OB R&B

## 2024-03-25 RX ADMIN — ACETAMINOPHEN 1000 MG: 500 TABLET ORAL at 00:05

## 2024-03-25 RX ADMIN — LABETALOL HYDROCHLORIDE 200 MG: 200 TABLET, FILM COATED ORAL at 04:04

## 2024-03-25 RX ADMIN — Medication 10 ML: at 14:05

## 2024-03-25 RX ADMIN — IBUPROFEN 800 MG: 800 TABLET, FILM COATED ORAL at 04:05

## 2024-03-25 RX ADMIN — LABETALOL HYDROCHLORIDE 200 MG: 200 TABLET, FILM COATED ORAL at 18:20

## 2024-03-25 RX ADMIN — DOCUSATE SODIUM 100 MG: 100 CAPSULE, LIQUID FILLED ORAL at 08:29

## 2024-03-25 RX ADMIN — ACETAMINOPHEN 1000 MG: 500 TABLET ORAL at 08:28

## 2024-03-25 RX ADMIN — FERROUS SULFATE TAB 325 MG (65 MG ELEMENTAL FE) 325 MG: 325 (65 FE) TAB at 14:05

## 2024-03-25 RX ADMIN — ACETAMINOPHEN 1000 MG: 500 TABLET ORAL at 18:21

## 2024-03-25 RX ADMIN — IBUPROFEN 800 MG: 800 TABLET, FILM COATED ORAL at 14:05

## 2024-03-25 RX ADMIN — VENLAFAXINE HYDROCHLORIDE 75 MG: 37.5 CAPSULE, EXTENDED RELEASE ORAL at 08:29

## 2024-03-25 ASSESSMENT — PAIN SCALES - GENERAL
PAINLEVEL_OUTOF10: 1
PAINLEVEL_OUTOF10: 0
PAINLEVEL_OUTOF10: 1
PAINLEVEL_OUTOF10: 0
PAINLEVEL_OUTOF10: 3

## 2024-03-25 ASSESSMENT — PAIN DESCRIPTION - LOCATION
LOCATION: ABDOMEN
LOCATION: BREAST

## 2024-03-25 ASSESSMENT — PAIN DESCRIPTION - DESCRIPTORS
DESCRIPTORS: CRAMPING
DESCRIPTORS: DISCOMFORT;SORE

## 2024-03-25 NOTE — PROGRESS NOTES
Dr Simmons notified of pt's BP of 161/81. Dr Simmons ordered re-check BP in 15 minutes and to give PO Labetalol order now.

## 2024-03-25 NOTE — PROGRESS NOTES
[facility] Ob/[facility]  Post Partum Progress Note     Subjective:   Patient is a 29 y/o  female S/P  at 37w5d EGA. PPD #2.      Pregnancy has been complicated by elevated blood pressure/severe pre-eclampsia, polyhydramnios (resolved), HSV, and depression.       Doing well today. No current complaints are noted including headache, change in vision, fever, chills, chest pain, shortness of breath, nausea, vomiting, diarrhea or constipation. The patient denies dizziness with ambulation or calf tenderness. Voiding spontaneously. Lochia is described as minimal. The patient plans to breastfeed.     Objective:   Vitals:    24 1901 24 1939 24 0006 24 0404   BP: (!) 120/59 122/75 (!) 111/59 (!) 147/79   Pulse: 75 76 76 68   Resp: 18 16  16   Temp:  98.2 °F (36.8 °C)  98.7 °F (37.1 °C)   TempSrc:  Oral  Oral   SpO2: 98%      Weight:       Height:           GENERAL APPEARANCE: alert, well appearing, in no apparent distress  LUNGS: Resp effort normal, LCTAB  HEART: Reg rate S1S2  ABDOMEN POSTPARTUM: benign non-tender, without masses or organomegaly palpable . Uterine Fundus firm, NT, Below umbilicus.   EXTREMITIES: no redness or tenderness in the calves or thighs, no edema  SKIN: normal coloration and turgor, no rashes, no suspicious skin lesions noted     Pertinent Labs:   Cr: 0.5 > 0.6   AST/ALT     > 331    > 242   UA 4.8 > 6.2   UPC 0.6 on admission   ntains abnormal data Magnesium  Order: [id number]  Status: Final result       Visible to patient: Yes (not seen)       Next appt: 2024 at 01:30 PM in Obstetrics and Gynecology (Mariela Quiroz MD)    0 Result Notes            Component  Ref Range & Units 3/25/24 0728 3/25/24 0356 3/24/24 1908 3/24/24 0733 3/23/24 2232 3/23/24 1101 3/23/24 0316   Magnesium  1.80 - 2.40 mg/dL 2.60 High  3.10 High  5.10 High Panic  5.80 High Panic  5.40 High Panic  5.00 High Panic  4.60 High Panic    Resulting Agency [facility]  6.0 Low  6.2 Low  6.9 6.4   Albumin  3.4 - 5.0 g/dL 3.0 Low  2.8 Low  2.9 Low  3.1 Low  3.3 Low  3.3 Low  3.2 Low    Albumin/Globulin Ratio  1.1 - 2.2 1.1 0.9 Low  1.0 Low  1.1 1.1 0.9 Low  1.0 Low    Total Bilirubin  0.0 - 1.0 mg/dL <0.2 <0.2 <0.2 0.4 0.4 0.3 <0.2   Alkaline Phosphatase  40 - 129 U/L 126 129 137 High  168 High  179 High  187 High  173 High    ALT  10 - 40 U/L 15 14 14 13 12 12 13   AST  15 - 37 U/L 19 23 20 23 22 21 20   Resulting Agency [facility]              Specimen Collected: 03/25/24 07:28 EDT Last Resulted: 03/25/24 07:53 EDT         ntains abnormal data CBC  Order: [id number]  Status: Final result       Visible to patient: Yes (not seen)       Next appt: 03/29/2024 at 01:30 PM in Obstetrics and Gynecology (Mariela Quiroz MD)    0 Result Notes            Component  Ref Range & Units 3/25/24 0728 3/25/24 0356 3/24/24 1908 3/24/24 0733 3/23/24 2232 3/23/24 1101 3/23/24 0316   WBC  4.0 - 11.0 K/uL 7.1 6.8 8.5 9.7 13.5 High  9.6 10.1   RBC  4.00 - 5.20 M/uL 3.25 Low  3.21 Low  3.29 Low  3.57 Low  3.76 Low  3.88 Low  3.69 Low    Hemoglobin  12.0 - 16.0 g/dL 9.6 Low  9.4 Low  9.5 Low  10.5 Low  10.9 Low  11.4 Low  10.9 Low    Hematocrit  36.0 - 48.0 % 29.0 Low  28.4 Low  29.3 Low  31.7 Low  33.4 Low  34.4 Low  32.7 Low    MCV  80.0 - 100.0 fL 89.0 88.4 88.9 88.9 88.7 88.8 88.6   MCH  26.0 - 34.0 pg 29.4 29.4 28.8 29.5 29.0 29.5 29.7   MCHC  31.0 - 36.0 g/dL 33.0 33.2 32.4 33.1 32.7 33.2 33.5   RDW  12.4 - 15.4 % 14.0 13.9 13.9 13.5 13.7 13.9 13.8   Platelets  135 - 450 K/uL 228 230 243 242 240 272 265   MPV  5.0 - 10.5 fL 8.4 8.4 8.3 8.8 9.1 8.7 9.1   Resulting Agency [facility]  [facility]  - [facility]              Specimen Collected: 03/25/24 07:28 EDT Last Resulted: 03/25/24 07:35 EDT           Assessment / Plan:  1) Post partum               - meeting post partum milestones               - hemodynamically stable      2) A+ / RI / GBS (-)     3) Baby Information               - Delivery Time / Date: 3/23/2024 1935               - APGARS: 7, 8               - Birth weight: 3.225 kg (7 lb 1.8 oz)               - Feeding: Breast               - Gender: Male    4) Pre-Eclampsia with SF         - s/p mag for seizure prophylaxis--discontinued last evening  - asymptomatic   - labs noted above  - cont seizure precautions   - labetalol 200 mg BID     5) Depression / anxiety               - restarted effexor at 75 mg daily               - denies mood issues so far / well supported   6) Anemia in pregnancy     Disposition:   Post Partum Instructions reviewed   Anticipate discharge on PPD # 2-4 pending clinical status        Aundrea Simmons D.O., [facility]

## 2024-03-25 NOTE — PROGRESS NOTES
Dr Simmons notified of pt's BP of 146/70. Physician ordered to recheck BP in 30 minutes and if okay, check BP Q2 hours.

## 2024-03-25 NOTE — PROGRESS NOTES
Dr Simmons notified of pt's BP of 133/62. Dr Simmons orders to hold 0900 dose of Labetalol until 1900 this evening to prevent patient from having to take medications in the middle of the night. Dr Simmons wants pt's BP to be checked Q4 hours.

## 2024-03-25 NOTE — LACTATION NOTE
This note was copied from a baby's chart.  Lactation Progress Note      Data:     F/u during lactation rounds with multip breast feeder, 2 days pp. Pregnancy complicated by pre-eclampsia. Mother is staying to continue to monitor b/p's now that she is off Mag[employer].   Infant is at 6% weight loss. Mother reports that her baby continues latching and breastfeeding well, states much better than her first child did. Mother states that she struggled with latching and milk supply with her first child, stating she pumped for about 1 month. Baby is already latched onto the left breast on consult. Breast tissue appears appropriate WNL on consultation. Mother c/o pinching with this latch, infant's mouth was opened widely with lips flared outward, but baby was further from the breast so that the chin and right cheek were not touching the breast, latch was noted to be symmtrical rather than asymmetrical causing lower lip to be latched shallow.    Action: Introduced self as LC on for the day and offered support. Reviewed the importance of obtaining a good deep comfortable latch at the breast with feedings and shown LC's concerns for shallow latch. Instructed how to break the suction of the latch and encouraged to do so. Nipple was slightly compressed with oval shape. Shown to mother and explained that with ELADIO her nipple should be rounded without compression or creasing. Gave tips to improve infant's positioning at the breast, showing how to hold infant more belly to belly, nose to nipple, showing how the ear, shoulder, and hip should be aligned to support good spinal aligment and a deep latch onto the breast. Shown mother where on the breast baby should latch for deep asymmetrical latch, and shown how to support the breast with C or U shaped hold as needed while sandwiching the breast tissue to assist in obtaining a deeper latch as needed. Shown how to obtain deep latch animation from [employer].  Infant rooting with wide open  mouth, ELADIO achieved with SRS and AS. Mother confirmed that the latch felt much more comfortable. Reassured of ELADIO observed with feeding and educated mother on how a good latch should look and feel vs a shallow latch, explaining that the latch should feel comfortable and that nipple should be rounded when baby releases from the breast. Reviewed care of sore nipples and lanolin provided.   Reviewed what to expect with breast feeding over the next 24-48 hours including breast care, how milk production works, what to expect with  feeding behaviors, and how to know baby is getting enough at the breast by meeting daily goals and expectations for infant feedings, output, and weight trends. Encouraged to continue to offer the breast ad mary when infant first begins to wake and show early hunger cues, and every 2-3 hours if baby is sleepy and without feeding cues. Gave tips to wake sleepy baby as needed, and encouraged much hand expression and STS contact with attempts to offer the breast. Discussed that baby will likely be sleepy following circumcision, followed by cluster feeding later on. Educated on what to expect with cluster feeding behaviors, reassuring of normalcy, and educating on how breastfeeding ad mary is so impactful on developing a good milk supply.  Instructed that baby should have a minimum of 8-12 good feedings daily in a 24 hour period daily after the first DOL. Name and number provided on whiteboard and educated on LC's hours for the day and how to contact. Encouraged to call for f/u support prn.     Response: Verbalized understanding of teaching provided. Pleased with deeper more comfortable latch. Infant continues nursing well. Will call for f/u support prn.

## 2024-03-26 VITALS
RESPIRATION RATE: 16 BRPM | DIASTOLIC BLOOD PRESSURE: 76 MMHG | SYSTOLIC BLOOD PRESSURE: 147 MMHG | HEART RATE: 75 BPM | WEIGHT: 270 LBS | HEIGHT: 67 IN | BODY MASS INDEX: 42.38 KG/M2 | OXYGEN SATURATION: 98 % | TEMPERATURE: 97.6 F

## 2024-03-26 LAB
ALBUMIN SERPL-MCNC: 3.1 G/DL (ref 3.4–5)
ALBUMIN/GLOB SERPL: 1.1 {RATIO} (ref 1.1–2.2)
ALP SERPL-CCNC: 126 U/L (ref 40–129)
ALT SERPL-CCNC: 18 U/L (ref 10–40)
ANION GAP SERPL CALCULATED.3IONS-SCNC: 11 MMOL/L (ref 3–16)
AST SERPL-CCNC: 21 U/L (ref 15–37)
BILIRUB SERPL-MCNC: <0.2 MG/DL (ref 0–1)
BUN SERPL-MCNC: 7 MG/DL (ref 7–20)
CALCIUM SERPL-MCNC: 8.6 MG/DL (ref 8.3–10.6)
CHLORIDE SERPL-SCNC: 103 MMOL/L (ref 99–110)
CO2 SERPL-SCNC: 25 MMOL/L (ref 21–32)
CREAT SERPL-MCNC: <0.5 MG/DL (ref 0.6–1.1)
DEPRECATED RDW RBC AUTO: 14 % (ref 12.4–15.4)
GFR SERPLBLD CREATININE-BSD FMLA CKD-EPI: >90 ML/MIN/{1.73_M2}
GLUCOSE SERPL-MCNC: 94 MG/DL (ref 70–99)
HCT VFR BLD AUTO: 29.4 % (ref 36–48)
HGB BLD-MCNC: 9.8 G/DL (ref 12–16)
LDH SERPL L TO P-CCNC: 273 U/L (ref 100–190)
MCH RBC QN AUTO: 29.5 PG (ref 26–34)
MCHC RBC AUTO-ENTMCNC: 33.3 G/DL (ref 31–36)
MCV RBC AUTO: 88.6 FL (ref 80–100)
PLATELET # BLD AUTO: 254 K/UL (ref 135–450)
PMV BLD AUTO: 8.2 FL (ref 5–10.5)
POTASSIUM SERPL-SCNC: 4.9 MMOL/L (ref 3.5–5.1)
PROT SERPL-MCNC: 6 G/DL (ref 6.4–8.2)
RBC # BLD AUTO: 3.32 M/UL (ref 4–5.2)
SODIUM SERPL-SCNC: 139 MMOL/L (ref 136–145)
URATE SERPL-MCNC: 6.1 MG/DL (ref 2.6–6)
WBC # BLD AUTO: 7.1 K/UL (ref 4–11)

## 2024-03-26 PROCEDURE — 6370000000 HC RX 637 (ALT 250 FOR IP): Performed by: OBSTETRICS & GYNECOLOGY

## 2024-03-26 PROCEDURE — 84550 ASSAY OF BLOOD/URIC ACID: CPT

## 2024-03-26 PROCEDURE — 99239 HOSP IP/OBS DSCHRG MGMT >30: CPT | Performed by: OBSTETRICS & GYNECOLOGY

## 2024-03-26 PROCEDURE — 85027 COMPLETE CBC AUTOMATED: CPT

## 2024-03-26 PROCEDURE — 83615 LACTATE (LD) (LDH) ENZYME: CPT

## 2024-03-26 PROCEDURE — 36415 COLL VENOUS BLD VENIPUNCTURE: CPT

## 2024-03-26 PROCEDURE — 80053 COMPREHEN METABOLIC PANEL: CPT

## 2024-03-26 RX ORDER — FERROUS SULFATE 325(65) MG
325 TABLET ORAL EVERY OTHER DAY
Qty: 30 TABLET | Refills: 3 | Status: SHIPPED | OUTPATIENT
Start: 2024-03-27

## 2024-03-26 RX ORDER — LABETALOL 200 MG/1
200 TABLET, FILM COATED ORAL EVERY 8 HOURS SCHEDULED
Qty: 60 TABLET | Refills: 1 | Status: SHIPPED | OUTPATIENT
Start: 2024-03-26

## 2024-03-26 RX ORDER — PSEUDOEPHEDRINE HCL 30 MG
100 TABLET ORAL 2 TIMES DAILY PRN
Qty: 30 CAPSULE | Refills: 1 | Status: SHIPPED | OUTPATIENT
Start: 2024-03-26

## 2024-03-26 RX ORDER — IBUPROFEN 800 MG/1
800 TABLET ORAL EVERY 8 HOURS PRN
Qty: 40 TABLET | Refills: 1 | Status: SHIPPED | OUTPATIENT
Start: 2024-03-26

## 2024-03-26 RX ADMIN — FERROUS SULFATE TAB 325 MG (65 MG ELEMENTAL FE) 325 MG: 325 (65 FE) TAB at 09:48

## 2024-03-26 RX ADMIN — LABETALOL HYDROCHLORIDE 200 MG: 200 TABLET, FILM COATED ORAL at 05:36

## 2024-03-26 NOTE — PROGRESS NOTES
Post Partum Progress Note    Subjective:  Savannah Ontiveros is a 28 y.o.  status-post  uncomplicated Vaginal Delivery. The pregnancy was complicated by  severe pre-eclampsia, polyhydramnios (resolved), HSV, and depression .     Patient is doing well with no concerns.  Pain is well controlled with current regimen.  Lochia mild.  Tolerating regular diet without difficulty.  Ambulating without difficulty, denies dizziness on standing.  Voiding without difficulty.  She is breast feeding.  Denies chest pain, SOB, or leg pain. No HA or spotty vision.    Objective:  BP (!) 150/72   Pulse 73   Temp 98.1 °F (36.7 °C) (Oral)   Resp 16   Ht 1.702 m (5' 7\")   Wt 122.5 kg (270 lb)   SpO2 98%   Breastfeeding Unknown   BMI 42.29 kg/m²     GENERAL APPEARANCE: alert, well appearing, in no apparent distress  LUNGS: clear to auscultation, no wheezes, rales or rhonchi, symmetric air entry  HEART: regular rate and rhythm  ABDOMEN POSTPARTUM: soft, fundus firm below U  EXTREMITIES: no redness or tenderness in the calves or thighs, no edema    I/O last 3 completed shifts:  In: 57.2 [I.V.:57.2]  Out: -     Pertinent Labs:   CBC:   Recent Labs     24  0356 24  0728 24  0625   WBC 6.8 7.1 7.1   HGB 9.4* 9.6* 9.8*   HCT 28.4* 29.0* 29.4*    228 254     BMP:    Recent Labs     24  0356 24  0728 24  0625    139 139   K 3.9 4.3 4.9    104 103   CO2    BUN 9 9 7   CREATININE 0.6 0.6 <0.5*   GLUCOSE 89 95 94     Hepatic:   Recent Labs     24  0356 24  0728 24  0625   AST 23 19 21   ALT 14 15 18   BILITOT <0.2 <0.2 <0.2   ALKPHOS 129 126 126     Mag:      Recent Labs     24  1908 24  0356 24  0728   MG 5.10* 3.10* 2.60*      Phos:   No results for input(s): \"PHOS\" in the last 72 hours.   INR: No results for input(s): \"INR\" in the last 72 hours.    Assessment/Plan:  Savannah Ontiveros is a 28 y.o.  status-post  uncomplicated Vaginal

## 2024-03-26 NOTE — DISCHARGE SUMMARY
Department of Obstetrics and Gynecology  Postpartum Discharge Summary      Admit Date: 3/22/2024    Admit Diagnosis: Severe pre-eclampsia, antepartum [O14.10]    Discharge Date: 24 -- patient left against medical advice    Condition at Discharge: left AMA    Discharge Diagnoses:  , severe preE    Discharge Disposition:  Home    Service: Obstetrics    Postpartum complications:  Patient admitted from office for IOL due to severe preE, had uncomplicated . Postpartum labile BP noted, advised to stay inpateint for medication titration however on PPD#3 signed out AMA (see progress note from day of discharge).      Data:  Information for the patient's :  Sam Ontiveros [[id number]]      Weight   Information for the patient's :  Weston Sam Savannah [[id number]]      Apgars   Information for the patient's :  Sam Ontiveros [[id number]]       Disposition of Baby:  Home with mother      Current Discharge Medication List        START taking these medications    Details   ibuprofen (ADVIL;MOTRIN) 800 MG tablet Take 1 tablet by mouth every 8 hours as needed for Pain  Qty: 40 tablet, Refills: 1      labetalol (NORMODYNE) 200 MG tablet Take 1 tablet by mouth every 8 hours  Qty: 60 tablet, Refills: 1      ferrous sulfate (IRON 325) 325 (65 Fe) MG tablet Take 1 tablet by mouth every other day  Qty: 30 tablet, Refills: 3      docusate sodium (COLACE, DULCOLAX) 100 MG CAPS Take 100 mg by mouth 2 times daily as needed for Constipation  Qty: 30 capsule, Refills: 1           CONTINUE these medications which have NOT CHANGED    Details   Prenatal MV-Min-Fe Fum-FA-DHA (PRENATAL 1 PO) Take 1 tablet by mouth daily      venlafaxine (EFFEXOR) 75 MG tablet Take 1 tablet by mouth daily           STOP taking these medications       acyclovir (ZOVIRAX) 400 MG tablet Comments:   Reason for Stopping:         promethazine (PHENERGAN) 12.5 MG tablet Comments:   Reason for Stopping:

## 2024-03-26 NOTE — PLAN OF CARE
Problem: Risk for Maternal Injury  Goal: Remains free from seizures and injury related to seizure activity  Description: INTERVENTIONS:.  -Maintain airway, patient safety and administer oxygen as ordered  -Monitor patient for seizure activity, document and report duration and descrition  -If Seizure occurs, turn patient to dide and suction secretions as needed  -Reorient patient post seizure  -Seizure Pads  -Instruct patient/family to notify RN of any seizure activity  -Instruct patient/family to call for assistance with activity based on assessment  Outcome: Progressing     Problem: Risk for Decreased Cardiac Output  Goal: Maintains optimal cardiac output and hemodynamic stability  Description: INTERVENTIONS:.  -Monitor blood pressure and heart rate  -Monitor urine output and notify licensed practitioner for values outside of   -Assess for signes of decreased cardiac output  -Administer fluid and /or volume expanders as ordered    Outcome: Progressing  Goal: Achieves optimal ventilation and oxygenation  Description: INTERVENTIONS:.  -Assess for changes in respiratory status   -Assess for changes in mentation and behavior  -Position to facilitate oxygenation and minimize respiratory effort  -Oxygen supplementation based on oxygen saturation or arterial blood gases  -Initiate smoking cessation protocol as indicated  -Encourage broncho-pulmonary hygiene including cough, deep breathe, incentive spirometry  -Assess the need for suctioning and aspirate as needed  -Assess and instruct to report shortness of breath or any respiratory difficulty  -Respiratory therapy support as indicated      Outcome: Progressing     Problem: Risk for Deficient Fluid Volume  Goal: Hemodynamic stability and optimal renal function maintained  Description: Interventions:.  -Monitor labs and assess for signs and symptoms of volume excess or deficit    -Monitor intake, output and patient weight  -Monitor response to interventions for patient's  volume status, including labs, urine output, blood pressure (other measures as available)  -Fluid restriction as ordered  -Instruct patient on fluid and nutrition restrictions as appropriate      Outcome: Progressing

## 2024-03-26 NOTE — DISCHARGE INSTRUCTIONS
Thank you for the opportunity to care for you and your family.    We hope that you are happy with the care we provided during your stay in the [facility]. We want to ensure that you have the help you need when you leave the hospital. If there is anything we can assist you with, please let us know.    Breastfeeding mothers may contact our lactation specialists with any problems or questions.  [facility] services phone number is [phone].  Leave a message and your call will be returned.    Please refer to the information provided in the postpartum care booklet.  The following are warning signs to remember.    Call 911 if you have:    Chest pain or pressure  Shortness of breath, even at rest  Thoughts of harming yourself or others  Seizures    Call your healthcare provider if you have:    Temperature of 100.4 degrees or higher  Stitches that are not healing        -- Swelling, bleeding, drainage, foul odor, redness or warmth in/around your           stitches, staples, or incision (scar)        -- Bad smelling blood or discharge from the vagina  Vaginal bleeding that has increased         -- Soaking through one pad in an hour        -- You are passing clots larger than the size of a lemon  Red, warm tender area(s) in your breast or calf  Headache that does not get better, even after taking medicine; or headache with vision changes    Remember to notify all healthcare providers from your date of delivery to up to one year after giving birth!    CARING FOR YOURSELF        DIET/ACTIVITY    Eat a well balanced diet focusing on foods high in fiber and protein.  Drink plenty of fluids, especially water.  To avoid constipation you may take a mild stool softener as recommended by your doctor or midwife.  Gradually increase your activity. Resume an exercise regime only after being advised by your doctor or midwife.  When sitting or lying down, keep your legs elevated to reduce swelling.  Avoid

## 2024-03-29 ENCOUNTER — POSTPARTUM VISIT (OUTPATIENT)
Dept: OBGYN CLINIC | Age: 29
End: 2024-03-29

## 2024-03-29 VITALS
TEMPERATURE: 98.9 F | DIASTOLIC BLOOD PRESSURE: 82 MMHG | BODY MASS INDEX: 36.35 KG/M2 | SYSTOLIC BLOOD PRESSURE: 126 MMHG | HEART RATE: 86 BPM | WEIGHT: 231.6 LBS | OXYGEN SATURATION: 99 % | HEIGHT: 67 IN

## 2024-03-29 DIAGNOSIS — O14.10 SEVERE PRE-ECLAMPSIA, ANTEPARTUM: ICD-10-CM

## 2024-03-29 PROBLEM — O99.340 DEPRESSION AFFECTING PREGNANCY: Status: RESOLVED | Noted: 2022-02-17 | Resolved: 2024-03-29

## 2024-03-29 PROBLEM — F32.A DEPRESSION AFFECTING PREGNANCY: Status: RESOLVED | Noted: 2022-02-17 | Resolved: 2024-03-29

## 2024-03-29 PROBLEM — O21.9 NAUSEA AND VOMITING IN PREGNANCY: Status: RESOLVED | Noted: 2023-10-10 | Resolved: 2024-03-29

## 2024-03-29 PROBLEM — O40.9XX0 POLYHYDRAMNIOS AFFECTING PREGNANCY: Status: RESOLVED | Noted: 2023-12-29 | Resolved: 2024-03-29

## 2024-03-29 PROBLEM — Z34.83 PRENATAL CARE, SUBSEQUENT PREGNANCY IN THIRD TRIMESTER: Status: RESOLVED | Noted: 2023-10-10 | Resolved: 2024-03-29

## 2024-03-29 PROCEDURE — 99024 POSTOP FOLLOW-UP VISIT: CPT | Performed by: OBSTETRICS & GYNECOLOGY

## 2024-03-29 NOTE — PROGRESS NOTES
Postpartum Visit    Subjective:  28 y.o.  female S/P uncomplicated Vaginal Delivery on 3/23/24 here for postpartum visit. The pregnancy was complicated by  severe pre-eclampsia, polyhydramnios (resolved), HSV and depression on meds .     Postpartum course has been uncomplicated. Has been checking home BP, taking labetalol 200mg TID and home BP have been 120s-130s/90s. No HA, chest pain, SOB.     Bleeding minimal. Bowel function is normal. Bladder function is normal. Patient is not sexually active. Contraception method is abstinence. Baby has been doing well without problems. Baby is feeding breast. No other complaints are noted including headache, change in vision, fever, chills, chest pain, shortness of breath, nausea, vomiting, diarrhea or constipation. The patient denies any urinary complaints or calf tenderness.     Review of Systems - The following ROS was otherwise negative, except as noted in the HPI: constitutional, respiratory, cardiovascular, gastrointestinal, genitourinary    Objective:  GENERAL APPEARANCE: alert, well appearing, in no apparent distress  LUNGS: CTAB  HEART: regular rate and rhythm  ABDOMEN POSTPARTUM: soft, nontender, fundus firm below U  EXTREMITIES: no redness or tenderness in the calves or thighs, no edema    MWQ: 7, no SI/HI, has good support at home    Impression:  28 y.o.  s/p vaginal delivery on 3/23/34 here for her postpartum visit:    Plan:  1.  (normal spontaneous vaginal delivery)  Doing well, routine care  - Feeding: breast, going well  - BCM: considering pills, will send rx next visit  - Moods: no signs/sx PPD, denies SI/HI, on meds  - Bleeding: resolving    2. Severe pre-eclampsia, antepartum  Normotensive today, asymptomatic  - continue labetalol 200mg TID  - pt will send BP logs next week, consider weaning medications at that time if able  - Return precautions discussed    Dispo: PRN or 4 weeks for PPV  Mariela Quiroz MD

## 2024-04-24 ENCOUNTER — POSTPARTUM VISIT (OUTPATIENT)
Dept: OBGYN CLINIC | Age: 29
End: 2024-04-24
Payer: MEDICAID

## 2024-04-24 VITALS
HEIGHT: 67 IN | HEART RATE: 88 BPM | OXYGEN SATURATION: 99 % | BODY MASS INDEX: 35.63 KG/M2 | DIASTOLIC BLOOD PRESSURE: 80 MMHG | SYSTOLIC BLOOD PRESSURE: 124 MMHG | WEIGHT: 227 LBS

## 2024-04-24 DIAGNOSIS — O14.10 SEVERE PRE-ECLAMPSIA, ANTEPARTUM: ICD-10-CM

## 2024-04-24 PROCEDURE — 99213 OFFICE O/P EST LOW 20 MIN: CPT | Performed by: OBSTETRICS & GYNECOLOGY

## 2024-04-24 NOTE — PROGRESS NOTES
Postpartum Visit    Subjective:  29 y.o.  female S/P uncomplicated Vaginal Delivery on 3/23/24 here for postpartum visit. The pregnancy was complicated by  severe pre-eclampsia, polyhydramnios (resolved), HSV and depression on meds .     Has been checking home BP, taking labetalol 200mg BID now and Bps have been 120s/80s. No HA, chest pain, SOB.     Postpartum course has been  otherwise uncomplicated . Bleeding has resolved. Bowel function is normal. Bladder function is normal. Patient is not sexually active. Contraception method is none. Baby has been doing well without problems. Baby is feeding bottle. No other complaints are noted including headache, change in vision, fever, chills, chest pain, shortness of breath, nausea, vomiting, diarrhea or constipation. The patient denies any urinary complaints or calf tenderness.     Review of Systems - The following ROS was otherwise negative, except as noted in the HPI: constitutional, respiratory, cardiovascular, gastrointestinal, genitourinary    Objective:  GENERAL APPEARANCE: alert, well appearing, in no apparent distress  LUNGS: clear to auscultation, no wheezes, rales or rhonchi, symmetric air entry  HEART: regular rate and rhythm  ABDOMEN POSTPARTUM: soft, nontender  EXTREMITIES: no redness or tenderness in the calves or thighs, no edema    MWQ: 3, no SI/HI    Impression:  29 y.o.  s/p vaginal delivery on 3/23/24 here for her postpartum visit:    Plan:  1.  (normal spontaneous vaginal delivery)  Doing well, routine care  - Feeding: bottle, going well  - BCM: desires OCP, will send rx after off BP meds and more than 6 weeks PP -- recommend abstinence until at least 6 weeks for healing  - Moods: no signs/sx PPD, denies SI/HI  - Bleeding: resolved    2. Severe pre-eclampsia, antepartum  Normotensive today, asymptomatic  - will decrease labetalol to 200mg daily  - pt to send BP logs in 2 weeks, if wnl will stop meds totally at that time  - return

## 2024-06-14 RX ORDER — LABETALOL 200 MG/1
200 TABLET, FILM COATED ORAL DAILY
Qty: 30 TABLET | Refills: 0 | Status: SHIPPED | OUTPATIENT
Start: 2024-06-14

## 2024-06-14 RX ORDER — LABETALOL 200 MG/1
TABLET, FILM COATED ORAL
Qty: 60 TABLET | Refills: 1 | OUTPATIENT
Start: 2024-06-14

## 2024-06-14 NOTE — TELEPHONE ENCOUNTER
Patient is requesting a refill of the labetalol medication.  Patient stated she is trying to get into [facility] but would like the refill to get her through until she can see them.

## 2024-06-27 ENCOUNTER — TELEMEDICINE (OUTPATIENT)
Dept: OBGYN CLINIC | Age: 29
End: 2024-06-27
Payer: MEDICAID

## 2024-06-27 DIAGNOSIS — Z30.09 ENCOUNTER FOR COUNSELING REGARDING CONTRACEPTION: Primary | ICD-10-CM

## 2024-06-27 PROCEDURE — 99213 OFFICE O/P EST LOW 20 MIN: CPT | Performed by: OBSTETRICS & GYNECOLOGY

## 2024-06-27 NOTE — PROGRESS NOTES
2024    TELEHEALTH EVALUATION -- Audio/Visual    HPI:    Savannah Ontiveros (:  1995) has requested an audio/video evaluation for the following concern(s):    BCM discussion. S/p  in 2024, has had elevated BP since that time. Saw her PCP who wants to start her on lisinopril, advised to discuss BCM options while on this. No complaints otherwise, doing well.     Review of Systems    Prior to Visit Medications    Medication Sig Taking? Authorizing Provider   Drospirenone 4 MG TABS Take 1 tablet by mouth daily Yes Mariela Quiroz MD   labetalol (NORMODYNE) 200 MG tablet Take 1 tablet by mouth daily  Mariela Quiroz MD   Prenatal MV-Min-Fe Fum-FA-DHA (PRENATAL 1 PO) Take 1 tablet by mouth daily  ProviderGenia MD   venlafaxine (EFFEXOR) 75 MG tablet Take 1 tablet by mouth daily  ProviderGenia MD       Social History     Tobacco Use    Smoking status: Former    Smokeless tobacco: Never   Vaping Use    Vaping Use: Never used   Substance Use Topics    Alcohol use: Yes     Comment: socially    Drug use: Never            PHYSICAL EXAMINATION:  [ INSTRUCTIONS:  \"[x]\" Indicates a positive item  \"[]\" Indicates a negative item  -- DELETE ALL ITEMS NOT EXAMINED]  Vital Signs: (As obtained by patient/caregiver or practitioner observation)    Blood pressure-  Heart rate-    Respiratory rate-    Temperature-  Pulse oximetry-     Constitutional: [x] Appears well-developed and well-nourished [] No apparent distress      [] Abnormal-   Mental status  [x] Alert and awake  [] Oriented to person/place/time []Able to follow commands      Eyes:  EOM    []  Normal  [] Abnormal-  Sclera  []  Normal  [] Abnormal -         Discharge []  None visible  [] Abnormal -    HENT:   [x] Normocephalic, atraumatic.  [] Abnormal   [] Mouth/Throat: Mucous membranes are moist.     External Ears [] Normal  [] Abnormal-     Neck: [] No visualized mass     Pulmonary/Chest: [x] Respiratory effort normal.  [] No visualized signs